# Patient Record
Sex: MALE | Race: BLACK OR AFRICAN AMERICAN | NOT HISPANIC OR LATINO | Employment: OTHER | URBAN - METROPOLITAN AREA
[De-identification: names, ages, dates, MRNs, and addresses within clinical notes are randomized per-mention and may not be internally consistent; named-entity substitution may affect disease eponyms.]

---

## 2017-02-14 ENCOUNTER — ALLSCRIPTS OFFICE VISIT (OUTPATIENT)
Dept: OTHER | Facility: OTHER | Age: 57
End: 2017-02-14

## 2017-02-14 DIAGNOSIS — N18.2 CHRONIC KIDNEY DISEASE, STAGE II (MILD): ICD-10-CM

## 2017-02-14 DIAGNOSIS — Z00.00 ENCOUNTER FOR GENERAL ADULT MEDICAL EXAMINATION WITHOUT ABNORMAL FINDINGS: ICD-10-CM

## 2017-02-14 DIAGNOSIS — G47.00 INSOMNIA: ICD-10-CM

## 2017-02-14 DIAGNOSIS — E78.49 OTHER HYPERLIPIDEMIA: ICD-10-CM

## 2017-02-14 DIAGNOSIS — Z12.5 ENCOUNTER FOR SCREENING FOR MALIGNANT NEOPLASM OF PROSTATE: ICD-10-CM

## 2017-02-14 DIAGNOSIS — I10 ESSENTIAL (PRIMARY) HYPERTENSION: ICD-10-CM

## 2017-02-14 DIAGNOSIS — G43.109 MIGRAINE WITH AURA AND WITHOUT STATUS MIGRAINOSUS, NOT INTRACTABLE: ICD-10-CM

## 2017-08-02 ENCOUNTER — GENERIC CONVERSION - ENCOUNTER (OUTPATIENT)
Dept: OTHER | Facility: OTHER | Age: 57
End: 2017-08-02

## 2017-08-15 ENCOUNTER — ALLSCRIPTS OFFICE VISIT (OUTPATIENT)
Dept: OTHER | Facility: OTHER | Age: 57
End: 2017-08-15

## 2018-01-13 VITALS
BODY MASS INDEX: 30.99 KG/M2 | OXYGEN SATURATION: 98 % | HEART RATE: 55 BPM | SYSTOLIC BLOOD PRESSURE: 136 MMHG | DIASTOLIC BLOOD PRESSURE: 86 MMHG | HEIGHT: 71 IN | TEMPERATURE: 97.2 F | WEIGHT: 221.38 LBS

## 2018-01-14 VITALS
OXYGEN SATURATION: 98 % | WEIGHT: 209.06 LBS | RESPIRATION RATE: 14 BRPM | SYSTOLIC BLOOD PRESSURE: 134 MMHG | TEMPERATURE: 96.8 F | DIASTOLIC BLOOD PRESSURE: 84 MMHG | BODY MASS INDEX: 29.27 KG/M2 | HEIGHT: 71 IN | HEART RATE: 61 BPM

## 2018-01-15 ENCOUNTER — ALLSCRIPTS OFFICE VISIT (OUTPATIENT)
Dept: OTHER | Facility: OTHER | Age: 58
End: 2018-01-15

## 2018-01-29 ENCOUNTER — TELEPHONE (OUTPATIENT)
Dept: FAMILY MEDICINE CLINIC | Facility: CLINIC | Age: 58
End: 2018-01-29

## 2018-01-29 DIAGNOSIS — G89.29 CHRONIC NONINTRACTABLE HEADACHE, UNSPECIFIED HEADACHE TYPE: Primary | ICD-10-CM

## 2018-01-29 DIAGNOSIS — G89.4 CHRONIC PAIN SYNDROME: ICD-10-CM

## 2018-01-29 DIAGNOSIS — R51.9 CHRONIC NONINTRACTABLE HEADACHE, UNSPECIFIED HEADACHE TYPE: Primary | ICD-10-CM

## 2018-01-29 RX ORDER — ZOLPIDEM TARTRATE 10 MG/1
1 TABLET ORAL
COMMUNITY
Start: 2013-06-24 | End: 2018-02-13 | Stop reason: SDUPTHER

## 2018-01-29 RX ORDER — HYDROCODONE BITARTRATE AND ACETAMINOPHEN 10; 325 MG/1; MG/1
1 TABLET ORAL 3 TIMES DAILY
COMMUNITY
Start: 2014-04-23 | End: 2018-02-12 | Stop reason: SDUPTHER

## 2018-01-29 RX ORDER — ATENOLOL 100 MG/1
1 TABLET ORAL DAILY
COMMUNITY
Start: 2016-01-12 | End: 2018-01-30 | Stop reason: SDUPTHER

## 2018-01-29 RX ORDER — OXYCODONE HCL 20 MG/1
20 TABLET, FILM COATED, EXTENDED RELEASE ORAL EVERY 12 HOURS SCHEDULED
Qty: 60 TABLET | Refills: 0 | Status: SHIPPED | OUTPATIENT
Start: 2018-01-29 | End: 2018-03-12 | Stop reason: SDUPTHER

## 2018-01-29 RX ORDER — BETAMETHASONE DIPROPIONATE 0.5 MG/G
CREAM TOPICAL 2 TIMES DAILY
COMMUNITY
Start: 2017-08-15 | End: 2018-06-21 | Stop reason: SDUPTHER

## 2018-01-29 RX ORDER — OXYCODONE HCL 20 MG/1
1 TABLET, FILM COATED, EXTENDED RELEASE ORAL 2 TIMES DAILY
COMMUNITY
Start: 2013-10-10 | End: 2018-01-29

## 2018-01-29 RX ORDER — TRIAMCINOLONE ACETONIDE 5 MG/G
CREAM TOPICAL 3 TIMES DAILY
COMMUNITY
Start: 2015-08-30 | End: 2018-08-07 | Stop reason: SDUPTHER

## 2018-01-29 RX ORDER — SILDENAFIL 100 MG/1
100 TABLET, FILM COATED ORAL AS NEEDED
COMMUNITY
Start: 2017-02-14 | End: 2022-03-02 | Stop reason: SDUPTHER

## 2018-01-29 NOTE — TELEPHONE ENCOUNTER
Patient called stating that he changed his medication from Oxycodone to [facility]  Patient states he in unhappy with the medication and would like to change is medication to OxyContin 20 MG Er  Please advise if you have any question patient would appreciate a call back from you

## 2018-01-30 DIAGNOSIS — I10 ESSENTIAL HYPERTENSION: Primary | ICD-10-CM

## 2018-01-31 RX ORDER — ATENOLOL 100 MG/1
TABLET ORAL
Qty: 30 TABLET | Refills: 5 | Status: SHIPPED | OUTPATIENT
Start: 2018-01-31 | End: 2018-02-28 | Stop reason: SDUPTHER

## 2018-02-09 ENCOUNTER — TELEPHONE (OUTPATIENT)
Dept: FAMILY MEDICINE CLINIC | Facility: CLINIC | Age: 58
End: 2018-02-09

## 2018-02-09 NOTE — TELEPHONE ENCOUNTER
I spoke with patient  He had a question regarding his current medication situation w/  Becka Jones nd oxycontin  Patient did not find Xstampza efficacious  He is trying to get OxyContin Re approved for him, which she has done well on for quite some time  Miguel Mendoza is working with him on this issue

## 2018-02-12 DIAGNOSIS — G43.119 INTRACTABLE MIGRAINE WITH AURA WITHOUT STATUS MIGRAINOSUS: Primary | ICD-10-CM

## 2018-02-12 RX ORDER — HYDROCODONE BITARTRATE AND ACETAMINOPHEN 10; 325 MG/1; MG/1
1 TABLET ORAL 3 TIMES DAILY
Qty: 90 TABLET | Refills: 0 | Status: SHIPPED | OUTPATIENT
Start: 2018-02-12 | End: 2018-02-13 | Stop reason: SDUPTHER

## 2018-02-13 ENCOUNTER — OFFICE VISIT (OUTPATIENT)
Dept: FAMILY MEDICINE CLINIC | Facility: CLINIC | Age: 58
End: 2018-02-13
Payer: COMMERCIAL

## 2018-02-13 VITALS
DIASTOLIC BLOOD PRESSURE: 84 MMHG | HEART RATE: 61 BPM | SYSTOLIC BLOOD PRESSURE: 142 MMHG | OXYGEN SATURATION: 96 % | HEIGHT: 70 IN | TEMPERATURE: 97.1 F | WEIGHT: 214.9 LBS | BODY MASS INDEX: 30.77 KG/M2

## 2018-02-13 DIAGNOSIS — M17.10 ARTHRITIS OF KNEE: ICD-10-CM

## 2018-02-13 DIAGNOSIS — G47.30 SLEEP APNEA, UNSPECIFIED TYPE: Primary | ICD-10-CM

## 2018-02-13 DIAGNOSIS — G43.119 INTRACTABLE MIGRAINE WITH AURA WITHOUT STATUS MIGRAINOSUS: ICD-10-CM

## 2018-02-13 DIAGNOSIS — N40.1 BENIGN PROSTATIC HYPERPLASIA WITH WEAK URINARY STREAM: ICD-10-CM

## 2018-02-13 DIAGNOSIS — R39.12 BENIGN PROSTATIC HYPERPLASIA WITH WEAK URINARY STREAM: ICD-10-CM

## 2018-02-13 PROCEDURE — 99214 OFFICE O/P EST MOD 30 MIN: CPT | Performed by: FAMILY MEDICINE

## 2018-02-13 RX ORDER — ZOLPIDEM TARTRATE 10 MG/1
10 TABLET ORAL
Qty: 30 TABLET | Refills: 2 | Status: SHIPPED | OUTPATIENT
Start: 2018-02-13 | End: 2018-05-23 | Stop reason: SDUPTHER

## 2018-02-13 RX ORDER — HYDROCODONE BITARTRATE AND ACETAMINOPHEN 10; 325 MG/1; MG/1
1 TABLET ORAL 3 TIMES DAILY
Qty: 90 TABLET | Refills: 0 | Status: SHIPPED | OUTPATIENT
Start: 2018-02-13 | End: 2018-03-12 | Stop reason: SDUPTHER

## 2018-02-14 NOTE — ASSESSMENT & PLAN NOTE
Patient had been stable on OxyContin 20 twice daily and hydrocodone 10/325 t i d   But his formulary no longer covers OxyContin, so he was switched to [address], but medication was not efficacious  Will attempt to get OxyContin covered or perhaps another alternative medication  Will refill hydrocodone today

## 2018-02-14 NOTE — ASSESSMENT & PLAN NOTE
Patient with history of weak urinary stream and nocturia  He is due for PSA    He will get this drawn in near future consider trial tamsulosin if symptoms persist

## 2018-02-14 NOTE — PROGRESS NOTES
Assessment/Plan:    Classic migraine with aura  Patient had been stable on OxyContin 20 twice daily and hydrocodone 10/325 t i d   But his formulary no longer covers OxyContin, so he was switched to [address], but medication was not efficacious  Will attempt to get OxyContin covered or perhaps another alternative medication  Will refill hydrocodone today  Arthritis of knee  History of chronic pain  Patient has been using "blue Emu" cream with benefit  Chronic kidney disease (CKD), stage II (mild)  Has been stable  Patient is due for blood work  He will get done in near future  Will call    Erectile dysfunction  Doing well with occasional use of Viagra without side effects    Benign prostatic hyperplasia with weak urinary stream  Patient with history of weak urinary stream and nocturia  He is due for PSA  He will get this drawn in near future consider trial tamsulosin if symptoms persist     Insomnia  Doing well on zolpidem 10 mg nightly without side effects    Benign essential hypertension  Reasonably controlled on atenolol 100 mg daily  Will continue to monitor       Diagnoses and all orders for this visit:    Sleep apnea, unspecified type  -     zolpidem (AMBIEN) 10 mg tablet; Take 1 tablet (10 mg total) by mouth daily at bedtime as needed for sleep    Intractable migraine with aura without status migrainosus  -     HYDROcodone-acetaminophen (NORCO)  mg per tablet; Take 1 tablet by mouth 3 (three) times a day Earliest Fill Date: 2/13/18 Max Daily Amount: 3 tablets    Arthritis of knee    Benign prostatic hyperplasia with weak urinary stream      patient will get fasting blood work done in near future  Will call with results  Recheck in 6 months (will probably need labs prior)     Subjective:      Patient ID: Omkar Rondon is a 65 y o  male  This is a recheck appointment for patient's chronic medical problems today  Overall he is doing well    Patient is attempting to straighten out situation with his formulary regarding his pain medications  He is requesting a cream to take for his knee  Otherwise he is doing well on all prescription medications without side effects patient has not gotten his fasting blood work done yet        The following portions of the patient's history were reviewed and updated as appropriate: allergies, current medications, past family history, past medical history, past social history, past surgical history and problem list     Review of Systems   Respiratory: Negative  Cardiovascular: Negative  Gastrointestinal: Negative  Genitourinary: Negative  Musculoskeletal: Positive for arthralgias  Objective:    Vitals:    02/13/18 1904   BP: 142/84   Pulse:    Temp:    SpO2:         Physical Exam   Cardiovascular: Normal rate, regular rhythm, normal heart sounds and intact distal pulses  Carotids: no bruits  Ext: no edema   Pulmonary/Chest: Effort normal  No respiratory distress  He has no wheezes  He has no rales  Psychiatric: He has a normal mood and affect   His behavior is normal  Thought content normal

## 2018-02-22 ENCOUNTER — TELEPHONE (OUTPATIENT)
Dept: FAMILY MEDICINE CLINIC | Facility: CLINIC | Age: 58
End: 2018-02-22

## 2018-02-27 NOTE — TELEPHONE ENCOUNTER
Results given to pt  Pt states in last visit he mentioned having urinary frequency that he has been experiencing for years, states it's getting worse to the point he's getting up constantly at night to use the bathroom  Pt would like to know if he should schedule an appointment or if he can just drop off a urine sample to dip? Pt denies irritation or burning when going

## 2018-02-28 DIAGNOSIS — I10 ESSENTIAL HYPERTENSION: ICD-10-CM

## 2018-02-28 DIAGNOSIS — R35.0 URINARY FREQUENCY: Primary | ICD-10-CM

## 2018-02-28 RX ORDER — ATENOLOL 100 MG/1
100 TABLET ORAL DAILY
Qty: 90 TABLET | Refills: 1 | Status: SHIPPED | OUTPATIENT
Start: 2018-02-28 | End: 2018-07-31 | Stop reason: SDUPTHER

## 2018-02-28 NOTE — TELEPHONE ENCOUNTER
THE [facility] with Urology details(consent ok)   Pt to contact to schedule an appt

## 2018-03-12 ENCOUNTER — OFFICE VISIT (OUTPATIENT)
Dept: FAMILY MEDICINE CLINIC | Facility: CLINIC | Age: 58
End: 2018-03-12
Payer: COMMERCIAL

## 2018-03-12 VITALS
TEMPERATURE: 96.8 F | RESPIRATION RATE: 17 BRPM | DIASTOLIC BLOOD PRESSURE: 84 MMHG | WEIGHT: 212.8 LBS | HEIGHT: 70 IN | HEART RATE: 66 BPM | OXYGEN SATURATION: 98 % | BODY MASS INDEX: 30.46 KG/M2 | SYSTOLIC BLOOD PRESSURE: 136 MMHG

## 2018-03-12 DIAGNOSIS — I10 BENIGN ESSENTIAL HYPERTENSION: ICD-10-CM

## 2018-03-12 DIAGNOSIS — G43.119 INTRACTABLE MIGRAINE WITH AURA WITHOUT STATUS MIGRAINOSUS: ICD-10-CM

## 2018-03-12 DIAGNOSIS — Z12.11 SCREENING FOR COLON CANCER: Primary | ICD-10-CM

## 2018-03-12 DIAGNOSIS — G89.29 CHRONIC NONINTRACTABLE HEADACHE, UNSPECIFIED HEADACHE TYPE: ICD-10-CM

## 2018-03-12 DIAGNOSIS — M79.641 HAND PAIN, RIGHT: ICD-10-CM

## 2018-03-12 DIAGNOSIS — G89.4 CHRONIC PAIN SYNDROME: ICD-10-CM

## 2018-03-12 DIAGNOSIS — R51.9 CHRONIC NONINTRACTABLE HEADACHE, UNSPECIFIED HEADACHE TYPE: ICD-10-CM

## 2018-03-12 PROCEDURE — 99213 OFFICE O/P EST LOW 20 MIN: CPT | Performed by: FAMILY MEDICINE

## 2018-03-12 PROCEDURE — 3075F SYST BP GE 130 - 139MM HG: CPT | Performed by: FAMILY MEDICINE

## 2018-03-12 PROCEDURE — 3079F DIAST BP 80-89 MM HG: CPT | Performed by: FAMILY MEDICINE

## 2018-03-12 RX ORDER — OXYCODONE HCL 20 MG/1
20 TABLET, FILM COATED, EXTENDED RELEASE ORAL EVERY 12 HOURS SCHEDULED
Qty: 60 TABLET | Refills: 0 | Status: SHIPPED | OUTPATIENT
Start: 2018-03-12 | End: 2018-04-05 | Stop reason: ALTCHOICE

## 2018-03-12 RX ORDER — HYDROCODONE BITARTRATE AND ACETAMINOPHEN 10; 325 MG/1; MG/1
1 TABLET ORAL 3 TIMES DAILY
Qty: 90 TABLET | Refills: 0 | Status: SHIPPED | OUTPATIENT
Start: 2018-03-12 | End: 2018-04-05 | Stop reason: ALTCHOICE

## 2018-03-12 NOTE — ASSESSMENT & PLAN NOTE
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

## 2018-03-12 NOTE — PROGRESS NOTES
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  Patient presents to have this checked out today  Patient has no history of prior problems  Maintains an active lifestyle  Denies any chest pain, shortness of breath, palpitations  Patient's examination today was negative for any clinically significant murmur (patient tested sitting, with Valsalva, standing, and crouching)  I completed patient's employee health form  (will fax for him)  Continue regular 6 month appointments    Hand pain, right  Patient was swelling on his 3rd MCP knuckle  Will sent for x-rays of right hand       Diagnoses and all orders for this visit:    Screening for colon cancer    Chronic nonintractable headache, unspecified headache type  -     oxyCODONE (OxyCONTIN) 20 mg 12 hr tablet; Take 1 tablet (20 mg total) by mouth every 12 (twelve) hours Earliest Fill Date: 3/12/18 Max Daily Amount: 40 mg    Chronic pain syndrome  -     oxyCODONE (OxyCONTIN) 20 mg 12 hr tablet; Take 1 tablet (20 mg total) by mouth every 12 (twelve) hours Earliest Fill Date: 3/12/18 Max Daily Amount: 40 mg    Intractable migraine with aura without status migrainosus  -     HYDROcodone-acetaminophen (NORCO)  mg per tablet; Take 1 tablet by mouth 3 (three) times a day Earliest Fill Date: 3/12/18 Max Daily Amount: 3 tablets    Hand pain, right  -     XR hand 3+ vw right; Future    Benign essential hypertension    Other orders  -     Cancel: Ambulatory referral to Gastroenterology; Future      will call with x-ray results  Keep next regularly scheduled 6 month appointment    Subjective:      Patient ID: Catarina Crockett is a 63 y o  male  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  Patient presents to have this checked out today  Patient has no history of prior problems  Maintains an active lifestyle    Denies any chest pain, shortness of breath, palpitations        The following portions of the patient's history were reviewed and updated as appropriate: allergies, current medications, past family history, past medical history, past social history, past surgical history and problem list     Review of Systems   Respiratory: Negative  Cardiovascular: Negative  Gastrointestinal: Negative  Genitourinary: Negative  Objective:      /84   Pulse 66   Temp (!) 96 8 °F (36 °C) (Tympanic)   Resp 17   Ht 5' 10" (1 778 m)   Wt 96 5 kg (212 lb 12 8 oz)   SpO2 98%   BMI 30 53 kg/m²          Physical Exam   Cardiovascular: Normal rate, regular rhythm, normal heart sounds and intact distal pulses  Carotids: no bruits  Ext: no edema   Pulmonary/Chest: Effort normal  No respiratory distress  He has no wheezes  He has no rales  Psychiatric: He has a normal mood and affect   His behavior is normal  Thought content normal

## 2018-03-27 ENCOUNTER — TELEPHONE (OUTPATIENT)
Dept: FAMILY MEDICINE CLINIC | Facility: CLINIC | Age: 58
End: 2018-03-27

## 2018-03-27 NOTE — TELEPHONE ENCOUNTER
Patient called stating that he would like to dicuss with you having to switch a medication because of his employer  Patient would like for you to give him a call back

## 2018-03-28 NOTE — TELEPHONE ENCOUNTER
I spoke with patient  He has applied for a job at [employer]  He received a call that the require him to be off his narcotic medications  He states they sent a form today for me to complete  He currently is taking OxyContin 20 mg Q 12 along with hydrocodone 10/325, t i d  p r n     I suggested that he start by decreasing dosage of OxyContin to 10 mg twice daily and stop hydrocodone (will wait for paperwork from [facility] to start wean off meds)

## 2018-04-02 ENCOUNTER — TELEPHONE (OUTPATIENT)
Dept: FAMILY MEDICINE CLINIC | Facility: CLINIC | Age: 58
End: 2018-04-02

## 2018-04-02 NOTE — TELEPHONE ENCOUNTER
Call patient  I have not yet received any correspondence from his employer yet regarding his medications  Does he still want to stop his pain meds? If so, I would recommend reducing OxyContin to 10 mg twice daily for 2 weeks, then once daily for 2 weeks     Then discontinuing

## 2018-04-03 NOTE — TELEPHONE ENCOUNTER
Pt called back, and stated that he has been off the rx for about a week now  He also stated that he will find out what the hold up is with the paper work from the employer

## 2018-04-03 NOTE — TELEPHONE ENCOUNTER
THE [facility] with details(consent ok)   Pt to call office with any questions

## 2018-04-04 NOTE — TELEPHONE ENCOUNTER
I spoke with patient  He has stopped his Oxy Contin and hydrocodone x 1 week  And is doing well off meds  He would like to continue  ( His new job requirements as a  for [employer] highly recommend/requires him being off of opioids to drive)  Call further problems    Form was completed and faxed back to [facility]

## 2018-05-23 DIAGNOSIS — G47.30 SLEEP APNEA, UNSPECIFIED TYPE: ICD-10-CM

## 2018-05-23 RX ORDER — ZOLPIDEM TARTRATE 10 MG/1
10 TABLET ORAL
Qty: 30 TABLET | Refills: 0 | Status: SHIPPED | OUTPATIENT
Start: 2018-05-23 | End: 2018-06-21 | Stop reason: SDUPTHER

## 2018-06-05 ENCOUNTER — OFFICE VISIT (OUTPATIENT)
Dept: FAMILY MEDICINE CLINIC | Facility: CLINIC | Age: 58
End: 2018-06-05
Payer: COMMERCIAL

## 2018-06-05 VITALS
TEMPERATURE: 97.2 F | HEIGHT: 70 IN | OXYGEN SATURATION: 98 % | DIASTOLIC BLOOD PRESSURE: 82 MMHG | RESPIRATION RATE: 18 BRPM | SYSTOLIC BLOOD PRESSURE: 136 MMHG | HEART RATE: 75 BPM | BODY MASS INDEX: 30.26 KG/M2 | WEIGHT: 211.4 LBS

## 2018-06-05 DIAGNOSIS — H66.90 ACUTE OTITIS MEDIA, UNSPECIFIED OTITIS MEDIA TYPE: Primary | ICD-10-CM

## 2018-06-05 PROCEDURE — 99213 OFFICE O/P EST LOW 20 MIN: CPT | Performed by: FAMILY MEDICINE

## 2018-06-05 RX ORDER — METHYLPREDNISOLONE 4 MG/1
TABLET ORAL
Qty: 21 TABLET | Refills: 0 | Status: SHIPPED | OUTPATIENT
Start: 2018-06-05 | End: 2019-09-18

## 2018-06-05 RX ORDER — AMOXICILLIN 500 MG/1
500 CAPSULE ORAL EVERY 8 HOURS SCHEDULED
Qty: 30 CAPSULE | Refills: 0 | Status: SHIPPED | OUTPATIENT
Start: 2018-06-05 | End: 2018-06-15

## 2018-06-05 NOTE — PROGRESS NOTES
Assessment/Plan:    Acute otitis media   Rx given for amoxicillin and Medrol Dosepak  Call if further problems       Diagnoses and all orders for this visit:    Acute otitis media, unspecified otitis media type  -     amoxicillin (AMOXIL) 500 mg capsule; Take 1 capsule (500 mg total) by mouth every 8 (eight) hours for 10 days  -     Methylprednisolone 4 MG TBPK; Use as directed on package          Subjective:      Patient ID: Aryan Du is a 63 y o  male  b/l ear pain for the past few days  Sore throat, congestion, pnd        Earache    Associated symptoms include coughing  The following portions of the patient's history were reviewed and updated as appropriate: allergies, current medications, past family history, past medical history, past social history, past surgical history and problem list     Review of Systems   Constitutional: Negative for chills and fever  HENT: Positive for congestion, ear pain and postnasal drip  Respiratory: Positive for cough  Negative for shortness of breath  Cardiovascular: Negative  Objective:      /82   Pulse 75   Temp (!) 97 2 °F (36 2 °C) (Tympanic)   Resp 18   Ht 5' 10 2" (1 783 m)   Wt 95 9 kg (211 lb 6 4 oz)   SpO2 98%   BMI 30 16 kg/m²          Physical Exam   HENT:   TMS  Bulging bilaterally   Neck: Normal range of motion  Neck supple     Pulmonary/Chest: Effort normal and breath sounds normal

## 2018-06-21 DIAGNOSIS — L30.9 DERMATITIS: Primary | ICD-10-CM

## 2018-06-21 DIAGNOSIS — G47.30 SLEEP APNEA, UNSPECIFIED TYPE: ICD-10-CM

## 2018-06-21 RX ORDER — BETAMETHASONE DIPROPIONATE 0.5 MG/G
CREAM TOPICAL
Qty: 50 G | Refills: 3 | Status: SHIPPED | OUTPATIENT
Start: 2018-06-21 | End: 2019-01-12 | Stop reason: SDUPTHER

## 2018-06-22 RX ORDER — ZOLPIDEM TARTRATE 10 MG/1
TABLET ORAL
Qty: 30 TABLET | Refills: 0 | Status: SHIPPED | OUTPATIENT
Start: 2018-06-22 | End: 2018-07-21 | Stop reason: SDUPTHER

## 2018-07-21 DIAGNOSIS — G47.30 SLEEP APNEA, UNSPECIFIED TYPE: ICD-10-CM

## 2018-07-21 RX ORDER — ZOLPIDEM TARTRATE 10 MG/1
TABLET ORAL
Qty: 30 TABLET | Refills: 0 | Status: SHIPPED | OUTPATIENT
Start: 2018-07-21 | End: 2019-09-18 | Stop reason: ALTCHOICE

## 2018-07-31 DIAGNOSIS — I10 ESSENTIAL HYPERTENSION: ICD-10-CM

## 2018-07-31 RX ORDER — ATENOLOL 100 MG/1
100 TABLET ORAL DAILY
Qty: 90 TABLET | Refills: 0 | Status: SHIPPED | OUTPATIENT
Start: 2018-07-31 | End: 2018-10-13 | Stop reason: SDUPTHER

## 2018-08-07 DIAGNOSIS — L30.9 DERMATITIS: Primary | ICD-10-CM

## 2018-08-07 RX ORDER — TRIAMCINOLONE ACETONIDE 5 MG/G
CREAM TOPICAL
Qty: 30 G | Refills: 2 | Status: SHIPPED | OUTPATIENT
Start: 2018-08-07 | End: 2019-09-18 | Stop reason: ALTCHOICE

## 2018-08-12 DIAGNOSIS — G47.30 SLEEP APNEA, UNSPECIFIED TYPE: ICD-10-CM

## 2018-08-13 RX ORDER — ZOLPIDEM TARTRATE 10 MG/1
TABLET ORAL
Qty: 30 TABLET | Refills: 0 | Status: SHIPPED | OUTPATIENT
Start: 2018-08-13 | End: 2018-08-16 | Stop reason: SDUPTHER

## 2018-08-16 DIAGNOSIS — G47.30 SLEEP APNEA, UNSPECIFIED TYPE: ICD-10-CM

## 2018-08-16 RX ORDER — ZOLPIDEM TARTRATE 10 MG/1
10 TABLET ORAL
Qty: 30 TABLET | Refills: 0 | Status: SHIPPED | OUTPATIENT
Start: 2018-08-16 | End: 2018-08-21 | Stop reason: SDUPTHER

## 2018-08-21 ENCOUNTER — OFFICE VISIT (OUTPATIENT)
Dept: FAMILY MEDICINE CLINIC | Facility: CLINIC | Age: 58
End: 2018-08-21
Payer: COMMERCIAL

## 2018-08-21 VITALS
HEART RATE: 55 BPM | RESPIRATION RATE: 16 BRPM | DIASTOLIC BLOOD PRESSURE: 90 MMHG | SYSTOLIC BLOOD PRESSURE: 136 MMHG | BODY MASS INDEX: 30.96 KG/M2 | HEIGHT: 69 IN | WEIGHT: 209 LBS | TEMPERATURE: 95.9 F | OXYGEN SATURATION: 98 %

## 2018-08-21 DIAGNOSIS — G47.30 SLEEP APNEA, UNSPECIFIED TYPE: ICD-10-CM

## 2018-08-21 DIAGNOSIS — G43.109 MIGRAINE WITH AURA AND WITHOUT STATUS MIGRAINOSUS, NOT INTRACTABLE: ICD-10-CM

## 2018-08-21 DIAGNOSIS — G47.00 INSOMNIA, UNSPECIFIED TYPE: ICD-10-CM

## 2018-08-21 DIAGNOSIS — I10 BENIGN ESSENTIAL HYPERTENSION: Primary | ICD-10-CM

## 2018-08-21 DIAGNOSIS — M79.641 HAND PAIN, RIGHT: ICD-10-CM

## 2018-08-21 DIAGNOSIS — H69.83 DYSFUNCTION OF BOTH EUSTACHIAN TUBES: ICD-10-CM

## 2018-08-21 PROBLEM — H69.93 DYSFUNCTION OF BOTH EUSTACHIAN TUBES: Status: ACTIVE | Noted: 2018-08-21

## 2018-08-21 PROCEDURE — 1036F TOBACCO NON-USER: CPT | Performed by: FAMILY MEDICINE

## 2018-08-21 PROCEDURE — 99214 OFFICE O/P EST MOD 30 MIN: CPT | Performed by: FAMILY MEDICINE

## 2018-08-21 RX ORDER — ZOLPIDEM TARTRATE 10 MG/1
10 TABLET ORAL
Qty: 30 TABLET | Refills: 2 | Status: SHIPPED | OUTPATIENT
Start: 2018-08-21 | End: 2018-09-17 | Stop reason: SDUPTHER

## 2018-08-21 NOTE — ASSESSMENT & PLAN NOTE
Ongoing bilateral hand pain and stiffness  Arthritis panel showed elevated C reactive protein, otherwise negative  I again advised him to get his x-rays done    Will also refer to Rheumatology for evaluation

## 2018-08-21 NOTE — ASSESSMENT & PLAN NOTE
Eustachian tube dysfunction  Recommend start Flonase    Recommend schedule follow up with his ENT if symptoms persist ( patient sees [facility])

## 2018-08-21 NOTE — PROGRESS NOTES
Assessment/Plan:    Benign essential hypertension   Reasonably controlled on atenolol 100 milligrams daily    Dysfunction of both eustachian tubes   Eustachian tube dysfunction  Recommend start Flonase  Recommend schedule follow up with his ENT if symptoms persist ( patient sees [facility])    Hand pain, right   Ongoing bilateral hand pain and stiffness  Arthritis panel showed elevated C reactive protein, otherwise negative  I again advised him to get his x-rays done  Will also refer to Rheumatology for evaluation    Insomnia   Doing well on zolpidem 10 milligrams at HS without side effects    Classic migraine with aura   Doing   Much better since discontinuing OxyContin and hydrocodone  Diagnoses and all orders for this visit:    Benign essential hypertension    Insomnia, unspecified type    Hand pain, right  -     Ambulatory referral to Rheumatology; Future    Dysfunction of both eustachian tubes    Sleep apnea, unspecified type  -     zolpidem (AMBIEN) 10 mg tablet; Take 1 tablet (10 mg total) by mouth daily at bedtime as needed for sleep    Migraine with aura and without status migrainosus, not intractable      LABS FROM February 2018 WERE REVIEWED  WILL CHECK YEARLY     6 MONTHS     Subjective:      Patient ID: Anahy Cerda is a 63 y o  male  Recheck chronic medical probs  Pt still having issues/pain in ears, otherwise doing well  No probs w/ current meds  Patient also having continued pain in his hands        The following portions of the patient's history were reviewed and updated as appropriate: allergies, current medications, past family history, past medical history, past social history, past surgical history and problem list     Review of Systems   Respiratory: Negative  Cardiovascular: Negative  Gastrointestinal: Negative  Genitourinary: Negative  Musculoskeletal: Positive for arthralgias           Objective:      /90 (BP Location: Left arm, Patient Position: Sitting, Cuff Size: Adult)   Pulse 55   Temp (!) 95 9 °F (35 5 °C) (Tympanic)   Resp 16   Ht 5' 9 29" (1 76 m)   Wt 94 8 kg (209 lb)   SpO2 98%   BMI 30 61 kg/m²          Physical Exam   Cardiovascular: Normal rate, regular rhythm, normal heart sounds and intact distal pulses  Carotids: no bruits  Ext: no edema   Pulmonary/Chest: Effort normal  No respiratory distress  He has no wheezes  He has no rales  Psychiatric: He has a normal mood and affect   His behavior is normal  Thought content normal

## 2018-09-17 DIAGNOSIS — G47.30 SLEEP APNEA, UNSPECIFIED TYPE: ICD-10-CM

## 2018-09-17 RX ORDER — ZOLPIDEM TARTRATE 10 MG/1
10 TABLET ORAL
Qty: 30 TABLET | Refills: 2 | Status: SHIPPED | OUTPATIENT
Start: 2018-09-17 | End: 2019-01-14 | Stop reason: SDUPTHER

## 2018-10-13 DIAGNOSIS — I10 ESSENTIAL HYPERTENSION: ICD-10-CM

## 2018-10-14 RX ORDER — ATENOLOL 100 MG/1
TABLET ORAL
Qty: 90 TABLET | Refills: 0 | Status: SHIPPED | OUTPATIENT
Start: 2018-10-14 | End: 2019-01-07 | Stop reason: SDUPTHER

## 2019-01-07 DIAGNOSIS — I10 ESSENTIAL HYPERTENSION: ICD-10-CM

## 2019-01-07 RX ORDER — ATENOLOL 100 MG/1
100 TABLET ORAL DAILY
Qty: 90 TABLET | Refills: 1 | Status: SHIPPED | OUTPATIENT
Start: 2019-01-07 | End: 2019-07-15 | Stop reason: SDUPTHER

## 2019-01-12 DIAGNOSIS — L30.9 DERMATITIS: ICD-10-CM

## 2019-01-14 DIAGNOSIS — G47.30 SLEEP APNEA, UNSPECIFIED TYPE: ICD-10-CM

## 2019-01-14 RX ORDER — BETAMETHASONE DIPROPIONATE 0.5 MG/G
CREAM TOPICAL
Qty: 50 G | Refills: 3 | Status: SHIPPED | OUTPATIENT
Start: 2019-01-14 | End: 2019-02-14 | Stop reason: SDUPTHER

## 2019-01-14 RX ORDER — ZOLPIDEM TARTRATE 10 MG/1
10 TABLET ORAL
Qty: 30 TABLET | Refills: 0 | Status: SHIPPED | OUTPATIENT
Start: 2019-01-14 | End: 2019-02-14 | Stop reason: SDUPTHER

## 2019-02-14 DIAGNOSIS — L30.9 DERMATITIS: ICD-10-CM

## 2019-02-14 DIAGNOSIS — G47.30 SLEEP APNEA, UNSPECIFIED TYPE: ICD-10-CM

## 2019-02-15 RX ORDER — ZOLPIDEM TARTRATE 10 MG/1
10 TABLET ORAL
Qty: 30 TABLET | Refills: 0 | Status: SHIPPED | OUTPATIENT
Start: 2019-02-15 | End: 2019-03-15 | Stop reason: SDUPTHER

## 2019-02-15 RX ORDER — BETAMETHASONE DIPROPIONATE 0.5 MG/G
CREAM TOPICAL
Qty: 50 G | Refills: 0 | Status: SHIPPED | OUTPATIENT
Start: 2019-02-15 | End: 2019-09-18 | Stop reason: ALTCHOICE

## 2019-03-15 DIAGNOSIS — G47.30 SLEEP APNEA, UNSPECIFIED TYPE: ICD-10-CM

## 2019-03-16 RX ORDER — ZOLPIDEM TARTRATE 10 MG/1
10 TABLET ORAL
Qty: 30 TABLET | Refills: 0 | Status: SHIPPED | OUTPATIENT
Start: 2019-03-16 | End: 2019-04-19 | Stop reason: SDUPTHER

## 2019-04-19 DIAGNOSIS — L30.9 DERMATITIS: ICD-10-CM

## 2019-04-19 DIAGNOSIS — G47.30 SLEEP APNEA, UNSPECIFIED TYPE: ICD-10-CM

## 2019-04-19 RX ORDER — ZOLPIDEM TARTRATE 10 MG/1
10 TABLET ORAL
Qty: 30 TABLET | Refills: 0 | Status: SHIPPED | OUTPATIENT
Start: 2019-04-19 | End: 2019-05-16 | Stop reason: SDUPTHER

## 2019-04-19 RX ORDER — BETAMETHASONE DIPROPIONATE 0.5 MG/G
CREAM TOPICAL
Qty: 50 G | Refills: 2 | Status: SHIPPED | OUTPATIENT
Start: 2019-04-19 | End: 2019-11-14 | Stop reason: SDUPTHER

## 2019-05-16 DIAGNOSIS — G47.30 SLEEP APNEA, UNSPECIFIED TYPE: ICD-10-CM

## 2019-05-17 RX ORDER — ZOLPIDEM TARTRATE 10 MG/1
10 TABLET ORAL
Qty: 30 TABLET | Refills: 0 | Status: SHIPPED | OUTPATIENT
Start: 2019-05-17 | End: 2019-06-17 | Stop reason: SDUPTHER

## 2019-06-17 DIAGNOSIS — G47.30 SLEEP APNEA, UNSPECIFIED TYPE: ICD-10-CM

## 2019-06-17 RX ORDER — ZOLPIDEM TARTRATE 10 MG/1
10 TABLET ORAL
Qty: 30 TABLET | Refills: 0 | Status: SHIPPED | OUTPATIENT
Start: 2019-06-17 | End: 2019-07-15 | Stop reason: SDUPTHER

## 2019-07-15 DIAGNOSIS — G47.30 SLEEP APNEA, UNSPECIFIED TYPE: ICD-10-CM

## 2019-07-15 DIAGNOSIS — I10 ESSENTIAL HYPERTENSION: ICD-10-CM

## 2019-07-15 RX ORDER — ZOLPIDEM TARTRATE 10 MG/1
10 TABLET ORAL
Qty: 30 TABLET | Refills: 0 | Status: SHIPPED | OUTPATIENT
Start: 2019-07-15 | End: 2019-08-15 | Stop reason: SDUPTHER

## 2019-07-15 RX ORDER — ATENOLOL 100 MG/1
TABLET ORAL
Qty: 90 TABLET | Refills: 1 | Status: SHIPPED | OUTPATIENT
Start: 2019-07-15 | End: 2020-01-09

## 2019-08-15 DIAGNOSIS — G47.30 SLEEP APNEA, UNSPECIFIED TYPE: ICD-10-CM

## 2019-08-15 RX ORDER — ZOLPIDEM TARTRATE 10 MG/1
10 TABLET ORAL
Qty: 30 TABLET | Refills: 0 | Status: SHIPPED | OUTPATIENT
Start: 2019-08-15 | End: 2019-09-14 | Stop reason: SDUPTHER

## 2019-08-15 NOTE — TELEPHONE ENCOUNTER
Last follow up 8/21/18    PDMP checked: Last fill 7/15/19    Pt is scheduled for med check up on  8/30/19, can Jose Manuel Zuleta orders be placed    Thank You

## 2019-08-28 ENCOUNTER — TELEPHONE (OUTPATIENT)
Dept: FAMILY MEDICINE CLINIC | Facility: CLINIC | Age: 59
End: 2019-08-28

## 2019-08-28 DIAGNOSIS — I10 HYPERTENSION, UNSPECIFIED TYPE: ICD-10-CM

## 2019-08-28 DIAGNOSIS — E03.9 HYPOTHYROIDISM, UNSPECIFIED TYPE: ICD-10-CM

## 2019-08-28 NOTE — TELEPHONE ENCOUNTER
Pt called in to the office stating [facility]  Thank you!

## 2019-08-29 DIAGNOSIS — E78.49 FAMILIAL COMBINED HYPERLIPIDEMIA: ICD-10-CM

## 2019-08-29 DIAGNOSIS — Z12.5 SCREENING FOR PROSTATE CANCER: ICD-10-CM

## 2019-08-29 DIAGNOSIS — I10 BENIGN ESSENTIAL HYPERTENSION: Primary | ICD-10-CM

## 2019-08-29 DIAGNOSIS — N18.2 CHRONIC KIDNEY DISEASE (CKD), STAGE II (MILD): ICD-10-CM

## 2019-08-29 NOTE — TELEPHONE ENCOUNTER
Called pt and informed pt states he will get labs done on Monday will take the scripts with him when he comes to his [facility] tomorrow

## 2019-08-29 NOTE — TELEPHONE ENCOUNTER
Lab orders placed    These either need to be picked up, or we can fax them to [facility] labs

## 2019-09-14 DIAGNOSIS — G47.30 SLEEP APNEA, UNSPECIFIED TYPE: ICD-10-CM

## 2019-09-14 RX ORDER — ZOLPIDEM TARTRATE 10 MG/1
10 TABLET ORAL
Qty: 30 TABLET | Refills: 0 | Status: SHIPPED | OUTPATIENT
Start: 2019-09-14 | End: 2019-09-18 | Stop reason: SDUPTHER

## 2019-09-18 ENCOUNTER — OFFICE VISIT (OUTPATIENT)
Dept: FAMILY MEDICINE CLINIC | Facility: CLINIC | Age: 59
End: 2019-09-18
Payer: COMMERCIAL

## 2019-09-18 VITALS
SYSTOLIC BLOOD PRESSURE: 158 MMHG | OXYGEN SATURATION: 98 % | BODY MASS INDEX: 30.31 KG/M2 | HEIGHT: 71 IN | RESPIRATION RATE: 18 BRPM | WEIGHT: 216.5 LBS | HEART RATE: 62 BPM | TEMPERATURE: 97.6 F | DIASTOLIC BLOOD PRESSURE: 108 MMHG

## 2019-09-18 DIAGNOSIS — G47.30 SLEEP APNEA, UNSPECIFIED TYPE: ICD-10-CM

## 2019-09-18 DIAGNOSIS — R06.83 SNORING: ICD-10-CM

## 2019-09-18 DIAGNOSIS — G43.109 MIGRAINE WITH AURA AND WITHOUT STATUS MIGRAINOSUS, NOT INTRACTABLE: ICD-10-CM

## 2019-09-18 DIAGNOSIS — G47.00 INSOMNIA, UNSPECIFIED TYPE: ICD-10-CM

## 2019-09-18 DIAGNOSIS — I10 BENIGN ESSENTIAL HYPERTENSION: Primary | ICD-10-CM

## 2019-09-18 LAB — HBA1C MFR BLD HPLC: 6.1 %

## 2019-09-18 PROCEDURE — 3008F BODY MASS INDEX DOCD: CPT | Performed by: FAMILY MEDICINE

## 2019-09-18 PROCEDURE — 99214 OFFICE O/P EST MOD 30 MIN: CPT | Performed by: FAMILY MEDICINE

## 2019-09-18 RX ORDER — ZOLPIDEM TARTRATE 10 MG/1
10 TABLET ORAL
Qty: 90 TABLET | Refills: 0 | Status: SHIPPED | OUTPATIENT
Start: 2019-09-18 | End: 2020-01-11

## 2019-09-18 RX ORDER — AMLODIPINE BESYLATE 5 MG/1
5 TABLET ORAL DAILY
Qty: 90 TABLET | Refills: 1 | Status: SHIPPED | OUTPATIENT
Start: 2019-09-18 | End: 2020-03-05

## 2019-09-18 NOTE — ASSESSMENT & PLAN NOTE
Patient states that his wife complains that he snores loudly with occasional gasping  He also admits to some daytime fatigue  Will sent for home sleep study

## 2019-09-18 NOTE — PROGRESS NOTES
[facility]      NAME: Nilton Carrera  AGE: 63 y o  SEX: male  : 1960   MRN: [id number]    DATE: 2019  TIME: 9:44 AM    Assessment and Plan     Problem List Items Addressed This Visit     Benign essential hypertension - Primary      Suboptimal  Patient states that that he is mostly compliant with atenolol 100  Will add amlodipine 5  Recommend checking home blood pressure readings  Call if still elevated         Relevant Medications    amLODIPine (NORVASC) 5 mg tablet    Classic migraine with aura      Headaches much improved since discontinuing OxyContin and hydrocodone  Relevant Medications    amLODIPine (NORVASC) 5 mg tablet    Insomnia       Doing well on zolpidem 10 mg nightly without side effects or falls  Will refill today         Snoring      Patient states that his wife complains that he snores loudly with occasional gasping  He also admits to some daytime fatigue  Will sent for home sleep study  Relevant Orders    Home Study      Other Visit Diagnoses     Sleep apnea, unspecified type        Relevant Medications    zolpidem (AMBIEN) 10 mg tablet              Return to office in:  Patient will get fasting blood work today  Will call with results     6 months (? Labs yearly)    Chief Complaint     Chief Complaint   Patient presents with    Follow-up     6m check up to chronic conditions with no recent labs  Pt will be going this morning to get blood work done       History of Present Illness      Patient presents for 6 month recheck appointment today  Overall is feeling well  He has noticed that his blood pressure has been a little bit elevated lately  He is mostly compliant with atenolol 100  Sleeping well on zolpidem without side effects  Headaches are much improved since discontinuing OxyContin and hydrocodone    Patient has not gotten his labs done yet      The following portions of the patient's history were reviewed and updated as appropriate: allergies, current medications, past family history, past medical history, past social history, past surgical history and problem list     Review of Systems   Review of Systems   Respiratory: Negative  Cardiovascular: Negative  Gastrointestinal: Negative  Genitourinary: Negative  Psychiatric/Behavioral: Positive for sleep disturbance  Active Problem List     Patient Active Problem List   Diagnosis    Other specified abnormal findings of blood chemistry    Arthralgia of multiple joints    Benign essential hypertension    Chronic kidney disease (CKD), stage II (mild)    Classic migraine with aura    Erectile dysfunction    Familial combined hyperlipidemia    Insomnia    Vitamin D deficiency    Arthritis of knee    Benign prostatic hyperplasia with weak urinary stream    Hand pain, right    Acute otitis media    Dysfunction of both eustachian tubes    Snoring       Objective   BP (!) 158/108 (BP Location: Left arm, Patient Position: Sitting, Cuff Size: Adult) Comment: Pt did not take BP meds  Pulse 62   Temp 97 6 °F (36 4 °C) (Tympanic)   Resp 18   Ht 5' 11" (1 803 m)   Wt 98 2 kg (216 lb 8 oz)   SpO2 98%   BMI 30 20 kg/m²     Physical Exam   Cardiovascular: Normal rate, regular rhythm, normal heart sounds and intact distal pulses  Carotids: no bruits  Ext: no edema   Pulmonary/Chest: Effort normal  No respiratory distress  He has no wheezes  He has no rales  Psychiatric: He has a normal mood and affect  His behavior is normal  Thought content normal        Pertinent Laboratory/Diagnostic Studies:   none    Current Medications     Current Outpatient Medications:     atenolol (TENORMIN) 100 mg tablet, TAKE 1 TABLET BY MOUTH EVERY DAY, Disp: 90 tablet, Rfl: 1    betamethasone, augmented, (DIPROLENE-AF) 0 05 % cream, APPLY AND RUB IN A THIN FILM TO AFFECTED AREAS TWICE DAILY  (AM AND PM)  , Disp: 50 g, Rfl: 2    sildenafil (VIAGRA) 100 mg tablet, Take 100 mg by mouth as needed , Disp: , Rfl:     zolpidem (AMBIEN) 10 mg tablet, Take 1 tablet (10 mg total) by mouth daily at bedtime as needed for sleep, Disp: 90 tablet, Rfl: 0    amLODIPine (NORVASC) 5 mg tablet, Take 1 tablet (5 mg total) by mouth daily, Disp: 90 tablet, Rfl: 1    Health Maintenance     Health Maintenance   Topic Date Due    Hepatitis C Screening  1960    Pneumococcal Vaccine: Pediatrics (0 to 5 Years) and At-Risk Patients (6 to 59 Years) (1 of 3 - PCV13) 03/02/1967    BMI: Followup Plan  03/02/1979    HEPATITIS B VACCINES (1 of 3 - Risk 3-dose series) 10/18/2019 (Originally 3/2/1980)    INFLUENZA VACCINE  10/28/2019 (Originally 7/1/2019)    Depression Screening PHQ  09/18/2020    BMI: Adult  09/18/2020    CRC Screening: Colonoscopy  01/01/2026    Pneumococcal Vaccine: 65+ Years (1 of 2 - PCV13) 03/02/2026    DTaP,Tdap,and Td Vaccines (2 - Td) 01/15/2028     Immunization History   Administered Date(s) Administered    Influenza, injectable, quadrivalent, preservative free 0 5 mL 11/17/2018    Tdap 01/15/2018       DO Grayson Bailey  [address]

## 2019-09-18 NOTE — ASSESSMENT & PLAN NOTE
Suboptimal  Patient states that that he is mostly compliant with atenolol 100  Will add amlodipine 5  Recommend checking home blood pressure readings    Call if still elevated

## 2019-09-21 DIAGNOSIS — R73.9 ELEVATED BLOOD SUGAR: Primary | ICD-10-CM

## 2019-09-24 ENCOUNTER — TELEPHONE (OUTPATIENT)
Dept: FAMILY MEDICINE CLINIC | Facility: CLINIC | Age: 59
End: 2019-09-24

## 2019-09-24 NOTE — TELEPHONE ENCOUNTER
Voicemail left yesterday at [phone] from patient asking for lab results  LMOM with detailed message regarding lab results, told him to follow up in 6 months, repeat labs 1 year  Call if any other questions

## 2019-09-25 ENCOUNTER — TELEPHONE (OUTPATIENT)
Dept: FAMILY MEDICINE CLINIC | Facility: CLINIC | Age: 59
End: 2019-09-25

## 2019-10-08 ENCOUNTER — TELEPHONE (OUTPATIENT)
Dept: SLEEP CENTER | Facility: CLINIC | Age: 59
End: 2019-10-08

## 2019-10-08 NOTE — TELEPHONE ENCOUNTER
----- Message from Delfino Lovett DO sent at 10/8/2019  3:28 PM EDT -----  Chart reviewed  Study approved   Schedule HST   ----- Message -----  From: Jonathan Tee  Sent: 9/27/2019  11:18 AM EDT  To: Sleep Medicine [facility] Provider    Study for approval

## 2019-10-30 ENCOUNTER — TELEPHONE (OUTPATIENT)
Dept: FAMILY MEDICINE CLINIC | Facility: CLINIC | Age: 59
End: 2019-10-30

## 2019-10-30 NOTE — TELEPHONE ENCOUNTER
Pt scheduled appt for Sleep Study, received approval through insurance, pt had to reschedule the appt and that prior authorization has   Needs to authorization reschedule appt for sleep study

## 2019-11-05 NOTE — TELEPHONE ENCOUNTER
HOME STUDY REAPPROVED THROUGH [address]  AUTH# [id number] STARTES 11/6/19 TO 12/20/19  LM FOR PT TO SCHEDULE APPT

## 2019-11-06 ENCOUNTER — TRANSCRIBE ORDERS (OUTPATIENT)
Dept: ADMINISTRATIVE | Facility: HOSPITAL | Age: 59
End: 2019-11-06

## 2019-11-06 DIAGNOSIS — R06.83 SNORING: Primary | ICD-10-CM

## 2019-11-11 ENCOUNTER — TELEPHONE (OUTPATIENT)
Dept: SLEEP CENTER | Facility: CLINIC | Age: 59
End: 2019-11-11

## 2019-11-11 NOTE — TELEPHONE ENCOUNTER
----- Message from Alessandro Curtis DO sent at 11/7/2019  4:45 PM EST -----  Chart reviewed  Study approved  Schedule HST  Referring provider to provide patient f/u  ----- Message -----  From: Marco Barillas MA  Sent: 11/7/2019   2:55 PM EST  To: Sleep Medicine [facility] Provider    This sleep study needs approval      If approved please sign and return to clerical pool  If denied please include reasons why  Also provide alternative testing if warranted  Please sign and return to clerical pool

## 2019-11-14 DIAGNOSIS — L30.9 DERMATITIS: ICD-10-CM

## 2019-11-14 RX ORDER — BETAMETHASONE DIPROPIONATE 0.5 MG/G
CREAM TOPICAL
Qty: 50 G | Refills: 2 | Status: SHIPPED | OUTPATIENT
Start: 2019-11-14 | End: 2020-08-25 | Stop reason: SDUPTHER

## 2019-12-11 ENCOUNTER — HOSPITAL ENCOUNTER (OUTPATIENT)
Dept: SLEEP CENTER | Facility: CLINIC | Age: 59
Discharge: HOME | End: 2019-12-11
Payer: COMMERCIAL

## 2019-12-11 DIAGNOSIS — R06.83 SNORING: ICD-10-CM

## 2019-12-11 PROCEDURE — G0399 HOME SLEEP TEST/TYPE 3 PORTA: HCPCS

## 2019-12-11 PROCEDURE — G0399 HOME SLEEP TEST/TYPE 3 PORTA: HCPCS | Performed by: PSYCHIATRY & NEUROLOGY

## 2019-12-12 NOTE — PROGRESS NOTES
Home Sleep Study Documentation    Pre-Sleep Home Study:    Set-up and instructions performed by: PHOENIX Schaffer    Technician performed demonstration for Patient: yes    Return demonstration performed by Patient: yes    Written instructions provided to Patient: yes    Patient signed consent form: yes        Post-Sleep Home Study:    Additional comments by Patient: pending    Home Sleep Study Failed:pending    Failure reason: pending    Reported or Detected: pending    Scored by: pending

## 2019-12-19 ENCOUNTER — TELEPHONE (OUTPATIENT)
Dept: FAMILY MEDICINE CLINIC | Facility: CLINIC | Age: 59
End: 2019-12-19

## 2019-12-19 ENCOUNTER — TELEPHONE (OUTPATIENT)
Dept: SLEEP CENTER | Facility: CLINIC | Age: 59
End: 2019-12-19

## 2019-12-19 DIAGNOSIS — G47.33 OSA (OBSTRUCTIVE SLEEP APNEA): Primary | ICD-10-CM

## 2019-12-19 NOTE — TELEPHONE ENCOUNTER
Left message that study has been read & patient can call Dr Barajas Bruni office to discuss results- # provided

## 2019-12-19 NOTE — TELEPHONE ENCOUNTER
----- Message from Georgie Cowan DO sent at 12/19/2019  8:12 AM EST -----  Referring physician to provide follow-up

## 2019-12-20 NOTE — TELEPHONE ENCOUNTER
Patient would like results of sleep study  Results are in Media tab  Please advise  Patient states it's ok to leave detailed message if he doesn't answer

## 2019-12-20 NOTE — TELEPHONE ENCOUNTER
Sleep study does show moderate to severe sleep apnea  The next step is to have a complete study done in the sleep lab to get him set for CPAP  He can call the sleep lab to schedule that at any time

## 2019-12-23 ENCOUNTER — TRANSCRIBE ORDERS (OUTPATIENT)
Dept: ADMINISTRATIVE | Facility: HOSPITAL | Age: 59
End: 2019-12-23

## 2019-12-23 ENCOUNTER — TELEPHONE (OUTPATIENT)
Dept: FAMILY MEDICINE CLINIC | Facility: CLINIC | Age: 59
End: 2019-12-23

## 2019-12-23 DIAGNOSIS — M25.511 RIGHT SHOULDER PAIN, UNSPECIFIED CHRONICITY: Primary | ICD-10-CM

## 2019-12-23 NOTE — TELEPHONE ENCOUNTER
[facility]  [address] [facility] [address]  Phone: [phone]    Name:  Leon Guerra  Current Date: 2017  : 1951  MRN: [id number]   GALILEO: [id number]    Visit Date: 2017  Address: [address] [facility] [address]  Phone: [phone]    Primary Care Provider     Name: Jamal Recinos MD    Phone: [phone]    The staff of [facility] would like to thank you for allowing us to assist you with your healthcare needs. The following includes patient education materials and information on how best to care for your illness/injury at home and when to see a physician. If you need to locate a Doctor or clinic close to you, please call the Doctor Referral Service at [phone]. The Service is available Monday through Thursday from 8 AM to 8 PM and  from 8 AM to 4 PM.    Patients Please Note: If further time off is required, or a medical clearance to return to work is required, it must be obtained through your primary physician.  Return to work clearances and extensions of \"Time-Off\" will not be given by the Emergency Department.     We hope that you leave our Emergency Department believing that we provided you with very good care.   Your Opinion Matters To Us  If you receive a patient satisfaction survey in the mail, please complete and return it in the postage-paid envelope.  We truly value and appreciate your feedback.  Emergency Department Care Providers   Physician:No att. providers found    Advanced Practice Provider:  Physician Assistant: DANNI Shirley     RN_________________ ED Tech__________________ Clerical_________________         Call patient, I agree with Dr Trever Wagner his assessment  His home sleep study shows possible significant apnea  He now needs to go to the sleep lab to get fitted for CPAP machine  They will determine his pressure setting

## 2019-12-23 NOTE — TELEPHONE ENCOUNTER
Patient called back and LM stating he had questions regarding the results of his sleep study  Called patient back and got voicemail  LMOM letting patient know to give us a call back with his questions/concerns

## 2019-12-27 NOTE — TELEPHONE ENCOUNTER
Patient called- questioned why he needs an additional sleep study  Explained results of HST & explained the purpose of titration study- patient verbalizes understanding of same   He will call back to schedule consult & titration study after he has his work schedule- [employer] # also provided

## 2019-12-29 ENCOUNTER — APPOINTMENT (EMERGENCY)
Dept: RADIOLOGY | Facility: HOSPITAL | Age: 59
End: 2019-12-29
Payer: COMMERCIAL

## 2019-12-29 ENCOUNTER — APPOINTMENT (EMERGENCY)
Dept: CT IMAGING | Facility: HOSPITAL | Age: 59
End: 2019-12-29
Payer: COMMERCIAL

## 2019-12-29 ENCOUNTER — HOSPITAL ENCOUNTER (EMERGENCY)
Facility: HOSPITAL | Age: 59
Discharge: HOME | End: 2019-12-29
Attending: EMERGENCY MEDICINE | Admitting: EMERGENCY MEDICINE
Payer: COMMERCIAL

## 2019-12-29 VITALS
HEART RATE: 58 BPM | BODY MASS INDEX: 29.83 KG/M2 | SYSTOLIC BLOOD PRESSURE: 187 MMHG | DIASTOLIC BLOOD PRESSURE: 97 MMHG | OXYGEN SATURATION: 97 % | TEMPERATURE: 98.3 F | WEIGHT: 213.85 LBS

## 2019-12-29 DIAGNOSIS — M71.21 POPLITEAL CYST, RIGHT: Primary | ICD-10-CM

## 2019-12-29 PROCEDURE — 73700 CT LOWER EXTREMITY W/O DYE: CPT

## 2019-12-29 PROCEDURE — 99284 EMERGENCY DEPT VISIT MOD MDM: CPT

## 2019-12-29 PROCEDURE — 99284 EMERGENCY DEPT VISIT MOD MDM: CPT | Performed by: PHYSICIAN ASSISTANT

## 2019-12-29 PROCEDURE — 73562 X-RAY EXAM OF KNEE 3: CPT

## 2019-12-29 RX ORDER — HYDROCODONE BITARTRATE AND ACETAMINOPHEN 5; 325 MG/1; MG/1
1 TABLET ORAL EVERY 8 HOURS PRN
Qty: 9 TABLET | Refills: 0 | Status: SHIPPED | OUTPATIENT
Start: 2019-12-29 | End: 2020-01-01

## 2019-12-29 NOTE — ED PROVIDER NOTES
History  Chief Complaint   Patient presents with    Leg Pain     Pt  has a small "bump" on his right shin  Pt  reports pain coming from area  Pt  reports no swelling or redness to leg  The patient is a 64yo with right knee swelling  Notes the problem has been there for a year but he bumped the area recently and now has a larger area of swelling and severe pain  No weakness or numbness  No redness or drainage  History provided by:  Patient      Prior to Admission Medications   Prescriptions Last Dose Informant Patient Reported? Taking? amLODIPine (NORVASC) 5 mg tablet   No Yes   Sig: Take 1 tablet (5 mg total) by mouth daily   atenolol (TENORMIN) 100 mg tablet  Self No Yes   Sig: TAKE 1 TABLET BY MOUTH EVERY DAY   betamethasone, augmented, (DIPROLENE-AF) 0 05 % cream   No Yes   Sig: APPLY AND RUB IN A THIN FILM TO AFFECTED AREAS TWICE DAILY  (AM AND PM)  Patient taking differently: as needed    sildenafil (VIAGRA) 100 mg tablet  Self Yes No   Sig: Take 100 mg by mouth as needed    zolpidem (AMBIEN) 10 mg tablet   No Yes   Sig: Take 1 tablet (10 mg total) by mouth daily at bedtime as needed for sleep      Facility-Administered Medications: None       Past Medical History:   Diagnosis Date    Hypertension        Past Surgical History:   Procedure Laterality Date    HYDROCELE EXCISION / REPAIR Right 02/11/2013    spermatic cord excision of hydrocele       Family History   Problem Relation Age of Onset    No Known Problems Mother     Kidney failure Father      I have reviewed and agree with the history as documented  Social History     Tobacco Use    Smoking status: Never Smoker    Smokeless tobacco: Current User    Tobacco comment: current smoker ( as per allscripts)   Substance Use Topics    Alcohol use: Yes     Comment: beer consumption (3 bottles per day)    Drug use: No        Review of Systems   Constitutional: Negative for activity change, appetite change and fatigue     HENT: Negative for nosebleeds, sneezing, sore throat, trouble swallowing and voice change  Eyes: Negative for photophobia, pain and visual disturbance  Respiratory: Negative for apnea, choking and stridor  Cardiovascular: Negative for palpitations and leg swelling  Gastrointestinal: Negative for anal bleeding and constipation  Endocrine: Negative for cold intolerance, heat intolerance, polydipsia and polyphagia  Genitourinary: Negative for decreased urine volume, enuresis, frequency, genital sores and urgency  Musculoskeletal: Negative for joint swelling and myalgias  Allergic/Immunologic: Negative for environmental allergies and food allergies  Neurological: Negative for tremors, seizures, speech difficulty and weakness  Hematological: Negative for adenopathy  Psychiatric/Behavioral: Negative for behavioral problems, decreased concentration, dysphoric mood and hallucinations  All other systems reviewed and are negative  Physical Exam  Physical Exam   Constitutional: He is oriented to person, place, and time  He appears well-developed and well-nourished  No distress  HENT:   Head: Normocephalic and atraumatic  Right Ear: External ear normal    Left Ear: External ear normal    Nose: Nose normal    Mouth/Throat: Oropharynx is clear and moist    Eyes: Pupils are equal, round, and reactive to light  Conjunctivae and EOM are normal    Neck: Normal range of motion  Neck supple  Cardiovascular: Normal rate, regular rhythm and normal heart sounds  Exam reveals no gallop and no friction rub  No murmur heard  Pulmonary/Chest: Effort normal and breath sounds normal  No respiratory distress  He has no wheezes  Abdominal: Soft  Bowel sounds are normal    Musculoskeletal: He exhibits tenderness  He exhibits no deformity  Legs:  Neurological: He is alert and oriented to person, place, and time  Skin: Skin is warm and dry  He is not diaphoretic  Psychiatric: He has a normal mood and affect   His behavior is normal    Vitals reviewed  Vital Signs  ED Triage Vitals [12/29/19 1322]   Temperature Pulse Resp Blood Pressure SpO2   98 3 °F (36 8 °C) 58 -- (!) 187/97 97 %      Temp Source Heart Rate Source Patient Position - Orthostatic VS BP Location FiO2 (%)   Temporal Monitor Sitting Right arm --      Pain Score       6           Vitals:    12/29/19 1322   BP: (!) 187/97   Pulse: 58   Patient Position - Orthostatic VS: Sitting         Visual Acuity      ED Medications  Medications - No data to display    Diagnostic Studies  Results Reviewed     None                 No orders to display              Procedures  Procedures         ED Course                               MDM  Number of Diagnoses or Management Options  Popliteal cyst, right:   Diagnosis management comments: Patient prefers discharge and will see his Ortho at [facility] tomorrow  Does not wish to wait for Nico Neely consult  Provided our referral to him if he changes his mind  Will also provide Norco for pain  Patient is understanding with plan and in agreement  Disposition  Final diagnoses:   None     ED Disposition     None      Follow-up Information    None         Patient's Medications   Discharge Prescriptions    No medications on file     No discharge procedures on file      ED Provider  Electronically Signed by           Chico Seymour PA-C  01/11/20 5225

## 2019-12-30 ENCOUNTER — HOSPITAL ENCOUNTER (OUTPATIENT)
Dept: MRI IMAGING | Facility: HOSPITAL | Age: 59
Discharge: HOME | End: 2019-12-30
Attending: ORTHOPAEDIC SURGERY
Payer: COMMERCIAL

## 2019-12-30 DIAGNOSIS — M25.511 RIGHT SHOULDER PAIN, UNSPECIFIED CHRONICITY: ICD-10-CM

## 2019-12-30 PROCEDURE — 73221 MRI JOINT UPR EXTREM W/O DYE: CPT

## 2020-01-09 DIAGNOSIS — I10 ESSENTIAL HYPERTENSION: ICD-10-CM

## 2020-01-09 RX ORDER — ATENOLOL 100 MG/1
TABLET ORAL
Qty: 90 TABLET | Refills: 0 | Status: SHIPPED | OUTPATIENT
Start: 2020-01-09 | End: 2020-01-14 | Stop reason: SDUPTHER

## 2020-01-10 DIAGNOSIS — G47.30 SLEEP APNEA, UNSPECIFIED TYPE: ICD-10-CM

## 2020-01-11 RX ORDER — ZOLPIDEM TARTRATE 10 MG/1
TABLET ORAL
Qty: 90 TABLET | Refills: 0 | Status: SHIPPED | OUTPATIENT
Start: 2020-01-11 | End: 2020-01-14 | Stop reason: SDUPTHER

## 2020-01-13 DIAGNOSIS — G47.30 SLEEP APNEA, UNSPECIFIED TYPE: ICD-10-CM

## 2020-01-13 DIAGNOSIS — I10 ESSENTIAL HYPERTENSION: ICD-10-CM

## 2020-01-13 NOTE — TELEPHONE ENCOUNTER
Fax came thru [facility]    Atenolol 100 mg tab  Take 1 tab daily   [facility]     Just refilled 1/9/2020 # 90 [facility] [address]    Zolpidem  Tart 10 mg tab  Take 1 tab @ hs  As needed for sleep   #90  [facility]    Just refilled 1/11/2020  #90 [facility] [address]

## 2020-01-14 NOTE — TELEPHONE ENCOUNTER
Checked PDMP: Last fill 1/11/2020 for 30, cannot refill until 2/11/2020  Called [facility] to cancel both Rx's since pt is requesting for them to be sent to mail order

## 2020-01-15 RX ORDER — ATENOLOL 100 MG/1
100 TABLET ORAL DAILY
Qty: 90 TABLET | Refills: 1 | Status: SHIPPED | OUTPATIENT
Start: 2020-01-15 | End: 2020-08-17

## 2020-01-15 RX ORDER — ZOLPIDEM TARTRATE 10 MG/1
10 TABLET ORAL
Qty: 90 TABLET | Refills: 0 | Status: SHIPPED | OUTPATIENT
Start: 2020-02-11 | End: 2020-04-13 | Stop reason: SDUPTHER

## 2020-03-05 DIAGNOSIS — I10 BENIGN ESSENTIAL HYPERTENSION: ICD-10-CM

## 2020-03-05 RX ORDER — AMLODIPINE BESYLATE 5 MG/1
TABLET ORAL
Qty: 90 TABLET | Refills: 1 | Status: SHIPPED | OUTPATIENT
Start: 2020-03-05 | End: 2020-03-18 | Stop reason: SDUPTHER

## 2020-03-18 ENCOUNTER — TELEPHONE (OUTPATIENT)
Dept: FAMILY MEDICINE CLINIC | Facility: CLINIC | Age: 60
End: 2020-03-18

## 2020-03-18 DIAGNOSIS — I10 BENIGN ESSENTIAL HYPERTENSION: ICD-10-CM

## 2020-03-18 RX ORDER — AMLODIPINE BESYLATE 5 MG/1
5 TABLET ORAL DAILY
Qty: 90 TABLET | Refills: 1 | Status: SHIPPED | OUTPATIENT
Start: 2020-03-18 | End: 2020-03-21 | Stop reason: SDUPTHER

## 2020-03-18 NOTE — TELEPHONE ENCOUNTER
Fax came thru [facility]    Amlodipine besylate 5 mg tabs  Take 1 tab daily  #[facility]      Recently sent refill to [facility] 3/5/2020 for 90 days

## 2020-03-21 DIAGNOSIS — I10 BENIGN ESSENTIAL HYPERTENSION: ICD-10-CM

## 2020-03-21 RX ORDER — AMLODIPINE BESYLATE 5 MG/1
5 TABLET ORAL DAILY
Qty: 90 TABLET | Refills: 1 | Status: SHIPPED | OUTPATIENT
Start: 2020-03-21 | End: 2020-08-29

## 2020-04-13 DIAGNOSIS — G47.30 SLEEP APNEA, UNSPECIFIED TYPE: ICD-10-CM

## 2020-04-13 RX ORDER — ZOLPIDEM TARTRATE 10 MG/1
10 TABLET ORAL
Qty: 90 TABLET | Refills: 0 | Status: SHIPPED | OUTPATIENT
Start: 2020-04-13 | End: 2020-04-24 | Stop reason: SDUPTHER

## 2020-04-24 DIAGNOSIS — G47.30 SLEEP APNEA, UNSPECIFIED TYPE: ICD-10-CM

## 2020-04-24 RX ORDER — ZOLPIDEM TARTRATE 10 MG/1
10 TABLET ORAL
Qty: 90 TABLET | Refills: 0 | Status: SHIPPED | OUTPATIENT
Start: 2020-04-24 | End: 2020-05-06 | Stop reason: SDUPTHER

## 2020-05-06 ENCOUNTER — OFFICE VISIT (OUTPATIENT)
Dept: FAMILY MEDICINE CLINIC | Facility: CLINIC | Age: 60
End: 2020-05-06
Payer: COMMERCIAL

## 2020-05-06 VITALS
DIASTOLIC BLOOD PRESSURE: 84 MMHG | SYSTOLIC BLOOD PRESSURE: 136 MMHG | RESPIRATION RATE: 17 BRPM | HEIGHT: 71 IN | BODY MASS INDEX: 31.92 KG/M2 | HEART RATE: 60 BPM | WEIGHT: 228 LBS | OXYGEN SATURATION: 97 % | TEMPERATURE: 97 F

## 2020-05-06 DIAGNOSIS — G47.00 INSOMNIA, UNSPECIFIED TYPE: ICD-10-CM

## 2020-05-06 DIAGNOSIS — G47.30 SLEEP APNEA, UNSPECIFIED TYPE: ICD-10-CM

## 2020-05-06 DIAGNOSIS — Z13.220 SCREENING FOR CHOLESTEROL LEVEL: ICD-10-CM

## 2020-05-06 DIAGNOSIS — I10 BENIGN ESSENTIAL HYPERTENSION: Primary | ICD-10-CM

## 2020-05-06 DIAGNOSIS — Z12.5 SCREENING FOR PROSTATE CANCER: ICD-10-CM

## 2020-05-06 DIAGNOSIS — Z13.1 SCREENING FOR DIABETES MELLITUS: ICD-10-CM

## 2020-05-06 DIAGNOSIS — G43.109 MIGRAINE WITH AURA AND WITHOUT STATUS MIGRAINOSUS, NOT INTRACTABLE: ICD-10-CM

## 2020-05-06 DIAGNOSIS — R06.83 SNORING: ICD-10-CM

## 2020-05-06 PROCEDURE — 99214 OFFICE O/P EST MOD 30 MIN: CPT | Performed by: FAMILY MEDICINE

## 2020-05-06 PROCEDURE — 3075F SYST BP GE 130 - 139MM HG: CPT | Performed by: FAMILY MEDICINE

## 2020-05-06 PROCEDURE — 3008F BODY MASS INDEX DOCD: CPT | Performed by: FAMILY MEDICINE

## 2020-05-06 PROCEDURE — 3079F DIAST BP 80-89 MM HG: CPT | Performed by: FAMILY MEDICINE

## 2020-05-06 RX ORDER — ZOLPIDEM TARTRATE 10 MG/1
10 TABLET ORAL
Qty: 90 TABLET | Refills: 1 | Status: SHIPPED | OUTPATIENT
Start: 2020-05-06 | End: 2020-11-16 | Stop reason: SDUPTHER

## 2020-08-17 DIAGNOSIS — I10 ESSENTIAL HYPERTENSION: ICD-10-CM

## 2020-08-17 RX ORDER — ATENOLOL 100 MG/1
TABLET ORAL
Qty: 90 TABLET | Refills: 3 | Status: SHIPPED | OUTPATIENT
Start: 2020-08-17 | End: 2021-08-04

## 2020-08-25 DIAGNOSIS — L30.9 DERMATITIS: ICD-10-CM

## 2020-08-25 RX ORDER — BETAMETHASONE DIPROPIONATE 0.5 MG/G
CREAM TOPICAL 2 TIMES DAILY
Qty: 50 G | Refills: 2 | Status: SHIPPED | OUTPATIENT
Start: 2020-08-25 | End: 2020-11-15

## 2020-08-25 NOTE — TELEPHONE ENCOUNTER
Phone call from Romana Gondola at [facility]      Last OV 5/6/20  Next OV 11/6/20
I have reviewed and confirmed nurses' notes...

## 2020-08-29 DIAGNOSIS — I10 BENIGN ESSENTIAL HYPERTENSION: ICD-10-CM

## 2020-08-29 RX ORDER — AMLODIPINE BESYLATE 5 MG/1
TABLET ORAL
Qty: 90 TABLET | Refills: 3 | Status: SHIPPED | OUTPATIENT
Start: 2020-08-29 | End: 2021-07-18

## 2020-11-14 DIAGNOSIS — L30.9 DERMATITIS: ICD-10-CM

## 2020-11-15 RX ORDER — BETAMETHASONE DIPROPIONATE 0.5 MG/G
CREAM TOPICAL
Qty: 50 G | Refills: 13 | Status: SHIPPED | OUTPATIENT
Start: 2020-11-15 | End: 2021-12-29

## 2020-11-16 DIAGNOSIS — G47.30 SLEEP APNEA, UNSPECIFIED TYPE: ICD-10-CM

## 2020-11-16 RX ORDER — ZOLPIDEM TARTRATE 10 MG/1
10 TABLET ORAL
Qty: 90 TABLET | Refills: 1 | Status: SHIPPED | OUTPATIENT
Start: 2020-11-16 | End: 2021-04-09

## 2021-01-21 ENCOUNTER — TELEPHONE (OUTPATIENT)
Dept: SLEEP CENTER | Facility: CLINIC | Age: 61
End: 2021-01-21

## 2021-01-22 NOTE — TELEPHONE ENCOUNTER
Returned the patient call he is asking for his sleep study results to go to his new provider     Advised he needs to supply providers name ,phone number and fax number

## 2021-02-27 ENCOUNTER — TELEMEDICINE (OUTPATIENT)
Dept: FAMILY MEDICINE CLINIC | Facility: CLINIC | Age: 61
End: 2021-02-27
Payer: COMMERCIAL

## 2021-02-27 DIAGNOSIS — G47.00 INSOMNIA, UNSPECIFIED TYPE: ICD-10-CM

## 2021-02-27 DIAGNOSIS — G43.109 MIGRAINE WITH AURA AND WITHOUT STATUS MIGRAINOSUS, NOT INTRACTABLE: ICD-10-CM

## 2021-02-27 DIAGNOSIS — G47.33 OSA (OBSTRUCTIVE SLEEP APNEA): ICD-10-CM

## 2021-02-27 DIAGNOSIS — I10 BENIGN ESSENTIAL HYPERTENSION: Primary | ICD-10-CM

## 2021-02-27 DIAGNOSIS — J30.1 ALLERGIC RHINITIS DUE TO POLLEN, UNSPECIFIED SEASONALITY: ICD-10-CM

## 2021-02-27 PROCEDURE — 99214 OFFICE O/P EST MOD 30 MIN: CPT | Performed by: FAMILY MEDICINE

## 2021-02-27 PROCEDURE — 1036F TOBACCO NON-USER: CPT | Performed by: FAMILY MEDICINE

## 2021-02-27 RX ORDER — AZELASTINE HYDROCHLORIDE 0.5 MG/ML
1 SOLUTION/ DROPS OPHTHALMIC 2 TIMES DAILY
Qty: 5 ML | Refills: 5 | Status: SHIPPED | OUTPATIENT
Start: 2021-02-27 | End: 2021-07-12 | Stop reason: SDUPTHER

## 2021-02-27 NOTE — PROGRESS NOTES
Virtual Regular Visit      Assessment/Plan:    Problem List Items Addressed This Visit     Benign essential hypertension - Primary      Patient did not purchased home blood pressure monitor yet  I strongly encouraged that he do so continue atenolol 100 and amlodipine 5         Classic migraine with aura      Doing well since discontinuing OxyContin and hydrocodone         Insomnia      Doing well on zolpidem 10 mg nightly without side effects or falls         BARRY (obstructive sleep apnea)      Patient will be getting his CPAP machine on March 12th  Allergic rhinitis due to pollen      Will give Rx for azelastine eye drops  Call further problems         Relevant Medications    azelastine (OPTIVAR) 0 05 % ophthalmic solution          Patient due/overdue for fasting blood work  He states he will get this done shortly (at [facility])    6 mos       Reason for visit is No chief complaint on file  Encounter provider Samuel Chapman DO    Provider located at [facility] 8  [address] [facility] [place] [address] [place] [address]-[facility] [address]  [phone]      Recent Visits  No visits were found meeting these conditions  Showing recent visits within past 7 days and meeting all other requirements     Today's Visits  Date Type Provider Dept   02/27/21 [place] [id number] Chad Peterson today's visits and meeting all other requirements     Future Appointments  No visits were found meeting these conditions  Showing future appointments within next 150 days and meeting all other requirements        The patient was identified by name and date of birth  Juan Janneth was informed that this is a telemedicine visit and that the visit is being conducted through [facility] and patient was informed that this is a secure, HIPAA-compliant platform  He agrees to proceed     My office door was closed  No one else was in the room    He acknowledged consent and understanding of privacy and security of the video platform  The patient has agreed to participate and understands they can discontinue the visit at any time  Patient is aware this is a billable service  Subjective  Alvaro Pascal is a 62 y o  male see HPI   Patient presents for virtual recheck chronic medical problems  Doing well since moving down to [place]  Has been experiencing considerable allergy symptoms and is requesting something to be prescribed for his eyes  Otherwise doing well  Doing well on atenolol and amlodipine for blood pressure  Doing well on zolpidem for insomnia  Patient will be getting CPAP in a few weeks  Past Medical History:   Diagnosis Date    Hypertension        Past Surgical History:   Procedure Laterality Date    HYDROCELE EXCISION / REPAIR Right 02/11/2013    spermatic cord excision of hydrocele       Current Outpatient Medications   Medication Sig Dispense Refill    amLODIPine (NORVASC) 5 mg tablet TAKE 1 TABLET DAILY 90 tablet 3    atenolol (TENORMIN) 100 mg tablet TAKE 1 TABLET DAILY 90 tablet 3    azelastine (OPTIVAR) 0 05 % ophthalmic solution Administer 1 drop to both eyes 2 (two) times a day 5 mL 5    betamethasone, augmented, (DIPROLENE-AF) 0 05 % cream APPLY TOPICALLY TWICE A DAY 50 g 13    sildenafil (VIAGRA) 100 mg tablet Take 100 mg by mouth as needed       zolpidem (AMBIEN) 10 mg tablet Take 1 tablet (10 mg total) by mouth daily at bedtime as needed for sleep 90 tablet 1     No current facility-administered medications for this visit  No Known Allergies    Review of Systems   Eyes: Positive for discharge, redness and itching  Respiratory: Negative  Cardiovascular: Negative  Gastrointestinal: Negative  Genitourinary: Negative  Video Exam    There were no vitals filed for this visit      Physical Exam     I spent 25 minutes directly with the patient during this visit      VIRTUAL VISIT DISCLAIMER    Alvaro Pascal acknowledges that he has consented to an online visit or consultation  He understands that the online visit is based solely on information provided by him, and that, in the absence of a face-to-face physical evaluation by the physician, the diagnosis he receives is both limited and provisional in terms of accuracy and completeness  This is not intended to replace a full medical face-to-face evaluation by the physician  Gera Villanueva understands and accepts these terms

## 2021-02-27 NOTE — ASSESSMENT & PLAN NOTE
Patient did not purchased home blood pressure monitor yet    I strongly encouraged that he do so continue atenolol 100 and amlodipine 5

## 2021-03-19 LAB
ALBUMIN SERPL-MCNC: 4.7 G/DL (ref 3.8–4.9)
ALBUMIN/GLOB SERPL: 1.6 {RATIO} (ref 1.2–2.2)
ALP SERPL-CCNC: 55 IU/L (ref 39–117)
ALT SERPL-CCNC: 27 IU/L (ref 0–44)
AST SERPL-CCNC: 22 IU/L (ref 0–40)
BASOPHILS # BLD AUTO: 0.1 X10E3/UL (ref 0–0.2)
BASOPHILS NFR BLD AUTO: 1 %
BILIRUB SERPL-MCNC: 0.6 MG/DL (ref 0–1.2)
BUN SERPL-MCNC: 19 MG/DL (ref 8–27)
BUN/CREAT SERPL: 16 (ref 10–24)
CALCIUM SERPL-MCNC: 9.4 MG/DL (ref 8.6–10.2)
CHLORIDE SERPL-SCNC: 99 MMOL/L (ref 96–106)
CHOLEST SERPL-MCNC: 215 MG/DL (ref 100–199)
CO2 SERPL-SCNC: 23 MMOL/L (ref 20–29)
CREAT SERPL-MCNC: 1.17 MG/DL (ref 0.76–1.27)
EOSINOPHIL # BLD AUTO: 0.3 X10E3/UL (ref 0–0.4)
EOSINOPHIL NFR BLD AUTO: 6 %
ERYTHROCYTE [DISTWIDTH] IN BLOOD BY AUTOMATED COUNT: 13.7 % (ref 11.6–15.4)
GLOBULIN SER-MCNC: 2.9 G/DL (ref 1.5–4.5)
GLUCOSE SERPL-MCNC: 107 MG/DL (ref 65–99)
HBA1C MFR BLD: 5.7 % (ref 4.8–5.6)
HCT VFR BLD AUTO: 45.4 % (ref 37.5–51)
HDLC SERPL-MCNC: 42 MG/DL
HGB BLD-MCNC: 15.2 G/DL (ref 13–17.7)
IMM GRANULOCYTES # BLD: 0 X10E3/UL (ref 0–0.1)
IMM GRANULOCYTES NFR BLD: 0 %
LDLC SERPL CALC-MCNC: 132 MG/DL (ref 0–99)
LDLC SERPL DIRECT ASSAY-MCNC: 134 MG/DL (ref 0–99)
LYMPHOCYTES # BLD AUTO: 1.4 X10E3/UL (ref 0.7–3.1)
LYMPHOCYTES NFR BLD AUTO: 29 %
MCH RBC QN AUTO: 31.1 PG (ref 26.6–33)
MCHC RBC AUTO-ENTMCNC: 33.5 G/DL (ref 31.5–35.7)
MCV RBC AUTO: 93 FL (ref 79–97)
MONOCYTES # BLD AUTO: 0.5 X10E3/UL (ref 0.1–0.9)
MONOCYTES NFR BLD AUTO: 9 %
NEUTROPHILS # BLD AUTO: 2.7 X10E3/UL (ref 1.4–7)
NEUTROPHILS NFR BLD AUTO: 55 %
POTASSIUM SERPL-SCNC: 4.2 MMOL/L (ref 3.5–5.2)
PROT SERPL-MCNC: 7.6 G/DL (ref 6–8.5)
PSA SERPL-MCNC: 0.4 NG/ML (ref 0–4)
RBC # BLD AUTO: 4.89 X10E6/UL (ref 4.14–5.8)
SL AMB EGFR AFRICAN AMERICAN: 78 ML/MIN/1.73
SL AMB EGFR NON AFRICAN AMERICAN: 67 ML/MIN/1.73
SL AMB VLDL CHOLESTEROL CALC: 41 MG/DL (ref 5–40)
SODIUM SERPL-SCNC: 137 MMOL/L (ref 134–144)
TRIGL SERPL-MCNC: 228 MG/DL (ref 0–149)
TSH SERPL DL<=0.005 MIU/L-ACNC: 1.31 UIU/ML (ref 0.45–4.5)
WBC # BLD AUTO: 4.9 X10E3/UL (ref 3.4–10.8)

## 2021-03-22 ENCOUNTER — TELEPHONE (OUTPATIENT)
Dept: FAMILY MEDICINE CLINIC | Facility: CLINIC | Age: 61
End: 2021-03-22

## 2021-03-22 NOTE — TELEPHONE ENCOUNTER
----- Message from Hiral Alejandre DO sent at 3/19/2021  1:09 PM EDT -----  Call patient, with lab results  His blood sugar was a little high at 107 (normal is less than 100)  Cholesterol is mildly elevated at 215 ( normal less than 200)  Remainder of labs looked good (including thyroid prostate, kidney and liver )  Recommend watch sweets in diet and reduce animal based fats    Recheck labs in 1 year

## 2021-04-08 DIAGNOSIS — G47.30 SLEEP APNEA, UNSPECIFIED TYPE: ICD-10-CM

## 2021-04-09 RX ORDER — ZOLPIDEM TARTRATE 10 MG/1
TABLET ORAL
Qty: 90 TABLET | Refills: 1 | Status: SHIPPED | OUTPATIENT
Start: 2021-04-09 | End: 2021-10-13 | Stop reason: SDUPTHER

## 2021-07-12 DIAGNOSIS — J30.1 ALLERGIC RHINITIS DUE TO POLLEN, UNSPECIFIED SEASONALITY: ICD-10-CM

## 2021-07-12 RX ORDER — AZELASTINE HYDROCHLORIDE 0.5 MG/ML
1 SOLUTION/ DROPS OPHTHALMIC 2 TIMES DAILY
Qty: 5 ML | Refills: 5 | Status: SHIPPED | OUTPATIENT
Start: 2021-07-12 | End: 2022-03-02 | Stop reason: SDUPTHER

## 2021-07-18 DIAGNOSIS — I10 BENIGN ESSENTIAL HYPERTENSION: ICD-10-CM

## 2021-07-18 RX ORDER — AMLODIPINE BESYLATE 5 MG/1
TABLET ORAL
Qty: 90 TABLET | Refills: 3 | Status: SHIPPED | OUTPATIENT
Start: 2021-07-18 | End: 2022-03-02 | Stop reason: SDUPTHER

## 2021-07-24 ENCOUNTER — TELEMEDICINE (OUTPATIENT)
Dept: FAMILY MEDICINE CLINIC | Facility: CLINIC | Age: 61
End: 2021-07-24
Payer: COMMERCIAL

## 2021-07-24 VITALS — SYSTOLIC BLOOD PRESSURE: 145 MMHG | DIASTOLIC BLOOD PRESSURE: 80 MMHG

## 2021-07-24 DIAGNOSIS — G43.109 MIGRAINE WITH AURA AND WITHOUT STATUS MIGRAINOSUS, NOT INTRACTABLE: ICD-10-CM

## 2021-07-24 DIAGNOSIS — I10 BENIGN ESSENTIAL HYPERTENSION: Primary | ICD-10-CM

## 2021-07-24 DIAGNOSIS — G47.00 INSOMNIA, UNSPECIFIED TYPE: ICD-10-CM

## 2021-07-24 DIAGNOSIS — L30.9 DERMATITIS: ICD-10-CM

## 2021-07-24 DIAGNOSIS — J30.1 ALLERGIC RHINITIS DUE TO POLLEN, UNSPECIFIED SEASONALITY: ICD-10-CM

## 2021-07-24 DIAGNOSIS — G47.33 OBSTRUCTIVE SLEEP APNEA: ICD-10-CM

## 2021-07-24 PROCEDURE — 3077F SYST BP >= 140 MM HG: CPT | Performed by: FAMILY MEDICINE

## 2021-07-24 PROCEDURE — 1036F TOBACCO NON-USER: CPT | Performed by: FAMILY MEDICINE

## 2021-07-24 PROCEDURE — 99214 OFFICE O/P EST MOD 30 MIN: CPT | Performed by: FAMILY MEDICINE

## 2021-07-24 PROCEDURE — 3079F DIAST BP 80-89 MM HG: CPT | Performed by: FAMILY MEDICINE

## 2021-07-24 NOTE — ASSESSMENT & PLAN NOTE
Still with diffuse rash  Been using betamethasone cream with some improvement  He also has eliminated diet soda, which has improved the problem as well  Long discussion with Kiah Perdue today regarding minimizing long-term use of topical steroids  Consider adding OTC Zyrtec    Also consider referral to dermatologist if not improving

## 2021-07-24 NOTE — PROGRESS NOTES
Virtual Regular Visit    Verification of patient location:    Patient is currently located in the state [facility]  Patient not located in a state that I am licensed, however I am treating under current waivers and flexibilities to treat patients via telehealth outside of my state    Assessment/Plan:    Problem List Items Addressed This Visit     Benign essential hypertension - Primary      Patient has been taking home blood pressures  This morning 145/80  Will maintain atenolol 100 amlodipine 5 for now  Continue home monitoring  Consider increasing amlodipine or adding a 3rd agent if needed by next visit if not improving         Classic migraine with aura      Has been stable since discontinuing OxyContin and hydrocodone         Insomnia      Doing well on zolpidem 10 mg nightly without side effects or falls         Obstructive sleep apnea      Using CPAP         Allergic rhinitis due to pollen    Dermatitis     Still with diffuse rash  Been using betamethasone cream with some improvement  He also has eliminated diet soda, which has improved the problem as well  Long discussion with Adalberto Mauro today regarding minimizing long-term use of topical steroids  Consider adding OTC Zyrtec  Also consider referral to dermatologist if not improving            due for colonoscopy  Patient states he will schedule near future        SIX MONTHS ( IN-PERSON VISIT)  Patient will be due for yearly labs after 3/17/2022       Reason for visit is No chief complaint on file  Encounter provider Lynn Borrego DO    Provider located at [facility] 8  [address] [facility] [address] [place] [address] 83  [phone]      Recent Visits  No visits were found meeting these conditions    Showing recent visits within past 7 days and meeting all other requirements  Today's Visits  Date Type Provider Dept   07/24/21 Telemedicine Lynn Borrego DO [facility]   Showing today's visits and meeting all other requirements  Future Appointments  No visits were found meeting these conditions  Showing future appointments within next 150 days and meeting all other requirements       The patient was identified by name and date of birth  Kimberly Willoughby was informed that this is a telemedicine visit and that the visit is being conducted through [address] and patient was informed that this is a secure, HIPAA-compliant platform  He agrees to proceed     My office door was closed  No one else was in the room  He acknowledged consent and understanding of privacy and security of the video platform  The patient has agreed to participate and understands they can discontinue the visit at any time  Patient is aware this is a billable service  Subjective  Kimberly Willoughby is a 62 y o  male See HPI   Patient presents for virtual med check appointment today  Overall he is doing well  Still with diffuse rash  Been using betamethasone cream with some improvement  He also has eliminated diet soda, which has improved the problem as well  Otherwise he is doing well on atenolol and amlodipine for blood pressure  Doing well on zolpidem for insomnia         Past Medical History:   Diagnosis Date    Hypertension        Past Surgical History:   Procedure Laterality Date    HYDROCELE EXCISION / REPAIR Right 02/11/2013    spermatic cord excision of hydrocele       Current Outpatient Medications   Medication Sig Dispense Refill    amLODIPine (NORVASC) 5 mg tablet TAKE 1 TABLET DAILY 90 tablet 3    atenolol (TENORMIN) 100 mg tablet TAKE 1 TABLET DAILY 90 tablet 3    azelastine (OPTIVAR) 0 05 % ophthalmic solution Administer 1 drop to both eyes 2 (two) times a day 5 mL 5    betamethasone, augmented, (DIPROLENE-AF) 0 05 % cream APPLY TOPICALLY TWICE A DAY 50 g 13    sildenafil (VIAGRA) 100 mg tablet Take 100 mg by mouth as needed       zolpidem (AMBIEN) 10 mg tablet TAKE 1 TABLET DAILY AT BEDTIME AS NEEDED FOR SLEEP 90 tablet 1     No current facility-administered medications for this visit  No Known Allergies    Review of Systems   Respiratory: Negative  Cardiovascular: Negative  Gastrointestinal: Negative  Genitourinary: Negative  Skin: Positive for rash  Video Exam    Vitals:    07/24/21 1050   BP: 145/80       Physical Exam  Cardiovascular:      Rate and Rhythm: Normal rate and regular rhythm  Heart sounds: Normal heart sounds  Comments: Carotids: no bruits  Ext: no edema  Pulmonary:      Effort: Pulmonary effort is normal  No respiratory distress  Breath sounds: No wheezing or rales  Psychiatric:         Behavior: Behavior normal          Thought Content: Thought content normal           I spent Twenty-five minutes directly with the patient during this visit    VIRTUAL VISIT DISCLAIMER      Greg Newman verbally agrees to participate in [employer]  Pt is aware that [employer] could be limited without vital signs or the ability to perform a full hands-on physical exam  Paulo Russell understands he or the provider may request at any time to terminate the video visit and request the patient to seek care or treatment in person

## 2021-07-24 NOTE — ASSESSMENT & PLAN NOTE
Patient has been taking home blood pressures  This morning 145/80  Will maintain atenolol 100 amlodipine 5 for now  Continue home monitoring    Consider increasing amlodipine or adding a 3rd agent if needed by next visit if not improving

## 2021-08-04 DIAGNOSIS — I10 ESSENTIAL HYPERTENSION: ICD-10-CM

## 2021-08-04 RX ORDER — ATENOLOL 100 MG/1
TABLET ORAL
Qty: 90 TABLET | Refills: 3 | Status: SHIPPED | OUTPATIENT
Start: 2021-08-04 | End: 2022-03-02 | Stop reason: SDUPTHER

## 2021-10-13 DIAGNOSIS — G47.30 SLEEP APNEA, UNSPECIFIED TYPE: ICD-10-CM

## 2021-10-14 RX ORDER — ZOLPIDEM TARTRATE 10 MG/1
10 TABLET ORAL
Qty: 90 TABLET | Refills: 1 | Status: SHIPPED | OUTPATIENT
Start: 2021-10-14 | End: 2022-03-26 | Stop reason: SDUPTHER

## 2021-12-28 DIAGNOSIS — L30.9 DERMATITIS: ICD-10-CM

## 2021-12-29 RX ORDER — BETAMETHASONE DIPROPIONATE 0.5 MG/G
CREAM TOPICAL
Qty: 50 G | Refills: 13 | Status: SHIPPED | OUTPATIENT
Start: 2021-12-29 | End: 2022-03-02 | Stop reason: SDUPTHER

## 2022-02-05 ENCOUNTER — TELEMEDICINE (OUTPATIENT)
Dept: FAMILY MEDICINE CLINIC | Facility: CLINIC | Age: 62
End: 2022-02-05
Payer: COMMERCIAL

## 2022-02-05 ENCOUNTER — TELEPHONE (OUTPATIENT)
Dept: FAMILY MEDICINE CLINIC | Facility: CLINIC | Age: 62
End: 2022-02-05

## 2022-02-05 DIAGNOSIS — G43.109 MIGRAINE WITH AURA AND WITHOUT STATUS MIGRAINOSUS, NOT INTRACTABLE: ICD-10-CM

## 2022-02-05 DIAGNOSIS — R07.89 ATYPICAL CHEST PAIN: ICD-10-CM

## 2022-02-05 DIAGNOSIS — Z13.1 SCREENING FOR DIABETES MELLITUS: ICD-10-CM

## 2022-02-05 DIAGNOSIS — G47.33 OSA (OBSTRUCTIVE SLEEP APNEA): ICD-10-CM

## 2022-02-05 DIAGNOSIS — R06.00 EXERTIONAL DYSPNEA: ICD-10-CM

## 2022-02-05 DIAGNOSIS — K64.9 HEMORRHOIDS, UNSPECIFIED HEMORRHOID TYPE: ICD-10-CM

## 2022-02-05 DIAGNOSIS — Z12.5 SCREENING FOR PROSTATE CANCER: ICD-10-CM

## 2022-02-05 DIAGNOSIS — I10 BENIGN ESSENTIAL HYPERTENSION: ICD-10-CM

## 2022-02-05 DIAGNOSIS — G47.00 INSOMNIA, UNSPECIFIED TYPE: Primary | ICD-10-CM

## 2022-02-05 DIAGNOSIS — Z13.220 SCREENING CHOLESTEROL LEVEL: ICD-10-CM

## 2022-02-05 PROBLEM — R06.09 EXERTIONAL DYSPNEA: Status: ACTIVE | Noted: 2022-02-05

## 2022-02-05 PROCEDURE — 1036F TOBACCO NON-USER: CPT | Performed by: FAMILY MEDICINE

## 2022-02-05 PROCEDURE — 99214 OFFICE O/P EST MOD 30 MIN: CPT | Performed by: FAMILY MEDICINE

## 2022-02-05 NOTE — ASSESSMENT & PLAN NOTE
History of obstructive sleep apnea  Patient tried CPAP without benefit  He discontinued use   He is currently sleeping well without next day fatigue

## 2022-02-05 NOTE — TELEPHONE ENCOUNTER
----- Message from Sheryl Williamson DO sent at 2/5/2022 11:31 AM EST -----   Please call patient to set up appointment with me on the next Saturday that I work   Thank you    BL

## 2022-02-05 NOTE — PROGRESS NOTES
Virtual Regular Visit    Verification of patient location:    Patient is located in the following state in which I hold an active license [place]      Assessment/Plan:    Problem List Items Addressed This Visit     Benign essential hypertension      History of borderline control  Patient has not been checking home blood pressures lately  He is compliant with atenolol 100 and amlodipine 5 mg daily  I encouraged him to start monitoring home pressures  I also would like to see him in the office  In near future to check his blood pressure         Relevant Orders    CBC and differential    TSH, 3rd generation with Free T4 reflex    Classic migraine with aura      Has been stable  Patient states his only headaches usually, when his blood pressure is elevated  He has not had any symptoms lately         Insomnia - Primary      Doing well on zolpidem 10 mg nightly without side effects         BARRY (obstructive sleep apnea)      History of obstructive sleep apnea  Patient tried CPAP without benefit  He discontinued use  He is currently sleeping well without next day fatigue         Exertional dyspnea     Kadeem Tinoco  Has been complaining of becoming winded easily in the past few weeks ( or longer)  No symptoms at rest    Will sent for nuclear stress test ( will need to hold atenolol for 24 hours prior to test)  Patient was advised to call 911 or go directly to ER should symptoms worsen  Relevant Orders    PSA, Total Screen    Hemorrhoids       Recommend OTC hydrocortisone, tucks pads, and Sitz baths p r n     Consider referral to colorectal specialist if symptoms persist           Other Visit Diagnoses     Screening for prostate cancer        Relevant Orders    PSA, Total Screen    Screening for diabetes mellitus        Relevant Orders    Comprehensive metabolic panel    Screening cholesterol level        Relevant Orders    Lipid Panel with Direct LDL reflex    Atypical chest pain        Relevant Orders    NM myocardial perfusion spect (rx stress and/or rest)       patient had COVID vaccination, and booster     due for yearly labs after March 18th (will mail lab slip)      recommend in-person follow-up appointment in near future    Reason for visit is No chief complaint on file  Encounter provider Torsten Hopkins DO    Provider located at [facility]  [address]  [address] [facility] [address]  [phone]      Recent Visits  No visits were found meeting these conditions  Showing recent visits within past 7 days and meeting all other requirements  Today's Visits  Date Type Provider Dept   02/05/22 [place]medicine [place] [id number] Chad Peetrson today's visits and meeting all other requirements  Future Appointments  No visits were found meeting these conditions  Showing future appointments within next 150 days and meeting all other requirements       The patient was identified by name and date of birth  Stanford Stock was informed that this is a telemedicine visit and that the visit is being conducted through [address] and patient was informed this is a secure, HIPAA-complaint platform  He agrees to proceed     My office door was closed  No one else was in the room  He acknowledged consent and understanding of privacy and security of the video platform  The patient has agreed to participate and understands they can discontinue the visit at any time  Patient is aware this is a billable service  Subjective  Stanford Stock is a 62 y o  male see HPI   This is a virtual follow-up appointment for patient  Patient has having some issues with exertional shortness of breath lately  Also problems with hemorrhoids  He is compliant with prescribed medications    Last labs were March 2021       Past Medical History:   Diagnosis Date    Hypertension        Past Surgical History:   Procedure Laterality Date    HYDROCELE EXCISION / REPAIR Right 02/11/2013 spermatic cord excision of hydrocele       Current Outpatient Medications   Medication Sig Dispense Refill    amLODIPine (NORVASC) 5 mg tablet TAKE 1 TABLET DAILY 90 tablet 3    atenolol (TENORMIN) 100 mg tablet TAKE 1 TABLET DAILY 90 tablet 3    azelastine (OPTIVAR) 0 05 % ophthalmic solution Administer 1 drop to both eyes 2 (two) times a day 5 mL 5    betamethasone, augmented, (DIPROLENE-AF) 0 05 % cream APPLY TOPICALLY TWICE A DAY 50 g 13    sildenafil (VIAGRA) 100 mg tablet Take 100 mg by mouth as needed       zolpidem (AMBIEN) 10 mg tablet Take 1 tablet (10 mg total) by mouth daily at bedtime as needed for sleep 90 tablet 1     No current facility-administered medications for this visit  No Known Allergies    Review of Systems   Respiratory: Positive for shortness of breath  Cardiovascular: Negative  Gastrointestinal: Negative  Genitourinary: Negative  Video Exam    There were no vitals filed for this visit  Physical Exam     I spent 40 minutes directly with the patient during this visit    VIRTUAL VISIT DISCLAIMER      Juaquin Eddy verbally agrees to participate in [employer]  Pt is aware that [employer] could be limited without vital signs or the ability to perform a full hands-on physical exam  Paulo Gallagher understands he or the provider may request at any time to terminate the video visit and request the patient to seek care or treatment in person

## 2022-02-05 NOTE — ASSESSMENT & PLAN NOTE
Recommend OTC hydrocortisone, tucks pads, and Sitz baths p r n     Consider referral to colorectal specialist if symptoms persist

## 2022-02-05 NOTE — ASSESSMENT & PLAN NOTE
History of borderline control  Patient has not been checking home blood pressures lately  He is compliant with atenolol 100 and amlodipine 5 mg daily  I encouraged him to start monitoring home pressures    I also would like to see him in the office  In near future to check his blood pressure

## 2022-02-05 NOTE — ASSESSMENT & PLAN NOTE
Luis Alberto Jenelle  Has been complaining of becoming winded easily in the past few weeks ( or longer)  No symptoms at rest    Will sent for nuclear stress test ( will need to hold atenolol for 24 hours prior to test)  Patient was advised to call 911 or go directly to ER should symptoms worsen

## 2022-02-05 NOTE — ASSESSMENT & PLAN NOTE
Has been stable  Patient states his only headaches usually, when his blood pressure is elevated    He has not had any symptoms lately

## 2022-02-07 ENCOUNTER — TELEPHONE (OUTPATIENT)
Dept: FAMILY MEDICINE CLINIC | Facility: CLINIC | Age: 62
End: 2022-02-07

## 2022-03-02 DIAGNOSIS — N52.9 ERECTILE DYSFUNCTION, UNSPECIFIED ERECTILE DYSFUNCTION TYPE: Primary | ICD-10-CM

## 2022-03-02 DIAGNOSIS — L30.9 DERMATITIS: ICD-10-CM

## 2022-03-02 DIAGNOSIS — I10 BENIGN ESSENTIAL HYPERTENSION: ICD-10-CM

## 2022-03-02 DIAGNOSIS — I10 ESSENTIAL HYPERTENSION: ICD-10-CM

## 2022-03-02 DIAGNOSIS — J30.1 ALLERGIC RHINITIS DUE TO POLLEN, UNSPECIFIED SEASONALITY: ICD-10-CM

## 2022-03-02 RX ORDER — AMLODIPINE BESYLATE 5 MG/1
5 TABLET ORAL DAILY
Qty: 90 TABLET | Refills: 3 | Status: SHIPPED | OUTPATIENT
Start: 2022-03-02

## 2022-03-02 RX ORDER — ATENOLOL 100 MG/1
100 TABLET ORAL DAILY
Qty: 90 TABLET | Refills: 3 | Status: SHIPPED | OUTPATIENT
Start: 2022-03-02

## 2022-03-02 RX ORDER — SILDENAFIL 100 MG/1
100 TABLET, FILM COATED ORAL AS NEEDED
Qty: 10 TABLET | Refills: 2 | Status: SHIPPED | OUTPATIENT
Start: 2022-03-02

## 2022-03-02 RX ORDER — BETAMETHASONE DIPROPIONATE 0.5 MG/G
1 CREAM TOPICAL 2 TIMES DAILY
Qty: 50 G | Refills: 13 | Status: SHIPPED | OUTPATIENT
Start: 2022-03-02

## 2022-03-02 RX ORDER — AZELASTINE HYDROCHLORIDE 0.5 MG/ML
1 SOLUTION/ DROPS OPHTHALMIC 2 TIMES DAILY
Qty: 5 ML | Refills: 5 | Status: SHIPPED | OUTPATIENT
Start: 2022-03-02 | End: 2022-07-05

## 2022-03-25 LAB
ALBUMIN SERPL-MCNC: 4.6 G/DL (ref 3.8–4.8)
ALBUMIN/GLOB SERPL: 1.8 {RATIO} (ref 1.2–2.2)
ALP SERPL-CCNC: 46 IU/L (ref 44–121)
ALT SERPL-CCNC: 26 IU/L (ref 0–44)
AST SERPL-CCNC: 27 IU/L (ref 0–40)
BASOPHILS # BLD AUTO: 0 X10E3/UL (ref 0–0.2)
BASOPHILS NFR BLD AUTO: 1 %
BILIRUB SERPL-MCNC: 0.4 MG/DL (ref 0–1.2)
BUN SERPL-MCNC: 21 MG/DL (ref 8–27)
BUN/CREAT SERPL: 19 (ref 10–24)
CALCIUM SERPL-MCNC: 9.1 MG/DL (ref 8.6–10.2)
CHLORIDE SERPL-SCNC: 98 MMOL/L (ref 96–106)
CHOLEST SERPL-MCNC: 220 MG/DL (ref 100–199)
CO2 SERPL-SCNC: 20 MMOL/L (ref 20–29)
CREAT SERPL-MCNC: 1.1 MG/DL (ref 0.76–1.27)
EGFR: 76 ML/MIN/1.73
EOSINOPHIL # BLD AUTO: 0.3 X10E3/UL (ref 0–0.4)
EOSINOPHIL NFR BLD AUTO: 6 %
ERYTHROCYTE [DISTWIDTH] IN BLOOD BY AUTOMATED COUNT: 13.1 % (ref 11.6–15.4)
GLOBULIN SER-MCNC: 2.6 G/DL (ref 1.5–4.5)
GLUCOSE SERPL-MCNC: 100 MG/DL (ref 65–99)
HCT VFR BLD AUTO: 43.7 % (ref 37.5–51)
HDLC SERPL-MCNC: 38 MG/DL
HGB BLD-MCNC: 15.1 G/DL (ref 13–17.7)
IMM GRANULOCYTES # BLD: 0 X10E3/UL (ref 0–0.1)
IMM GRANULOCYTES NFR BLD: 0 %
LDLC SERPL CALC-MCNC: 157 MG/DL (ref 0–99)
LDLC SERPL DIRECT ASSAY-MCNC: 157 MG/DL (ref 0–99)
LYMPHOCYTES # BLD AUTO: 1.3 X10E3/UL (ref 0.7–3.1)
LYMPHOCYTES NFR BLD AUTO: 27 %
MCH RBC QN AUTO: 32.3 PG (ref 26.6–33)
MCHC RBC AUTO-ENTMCNC: 34.6 G/DL (ref 31.5–35.7)
MCV RBC AUTO: 94 FL (ref 79–97)
MONOCYTES # BLD AUTO: 0.5 X10E3/UL (ref 0.1–0.9)
MONOCYTES NFR BLD AUTO: 10 %
NEUTROPHILS # BLD AUTO: 2.7 X10E3/UL (ref 1.4–7)
NEUTROPHILS NFR BLD AUTO: 56 %
POTASSIUM SERPL-SCNC: 3.9 MMOL/L (ref 3.5–5.2)
PROT SERPL-MCNC: 7.2 G/DL (ref 6–8.5)
PSA SERPL-MCNC: 0.4 NG/ML (ref 0–4)
RBC # BLD AUTO: 4.67 X10E6/UL (ref 4.14–5.8)
SL AMB VLDL CHOLESTEROL CALC: 25 MG/DL (ref 5–40)
SODIUM SERPL-SCNC: 136 MMOL/L (ref 134–144)
TRIGL SERPL-MCNC: 137 MG/DL (ref 0–149)
TSH SERPL DL<=0.005 MIU/L-ACNC: 1.79 UIU/ML (ref 0.45–4.5)
WBC # BLD AUTO: 4.9 X10E3/UL (ref 3.4–10.8)

## 2022-03-26 ENCOUNTER — OFFICE VISIT (OUTPATIENT)
Dept: FAMILY MEDICINE CLINIC | Facility: CLINIC | Age: 62
End: 2022-03-26
Payer: COMMERCIAL

## 2022-03-26 VITALS
TEMPERATURE: 98.2 F | HEIGHT: 71 IN | SYSTOLIC BLOOD PRESSURE: 134 MMHG | HEART RATE: 56 BPM | WEIGHT: 233 LBS | BODY MASS INDEX: 32.62 KG/M2 | OXYGEN SATURATION: 98 % | DIASTOLIC BLOOD PRESSURE: 82 MMHG

## 2022-03-26 DIAGNOSIS — G47.30 SLEEP APNEA, UNSPECIFIED TYPE: ICD-10-CM

## 2022-03-26 DIAGNOSIS — R73.09 ABNORMAL BLOOD SUGAR: ICD-10-CM

## 2022-03-26 DIAGNOSIS — H69.82 DYSFUNCTION OF LEFT EUSTACHIAN TUBE: ICD-10-CM

## 2022-03-26 DIAGNOSIS — G47.33 OSA (OBSTRUCTIVE SLEEP APNEA): ICD-10-CM

## 2022-03-26 DIAGNOSIS — I10 BENIGN ESSENTIAL HYPERTENSION: ICD-10-CM

## 2022-03-26 DIAGNOSIS — E78.49 FAMILIAL COMBINED HYPERLIPIDEMIA: Primary | ICD-10-CM

## 2022-03-26 DIAGNOSIS — G47.00 INSOMNIA, UNSPECIFIED TYPE: ICD-10-CM

## 2022-03-26 DIAGNOSIS — G47.33 OBSTRUCTIVE SLEEP APNEA: ICD-10-CM

## 2022-03-26 DIAGNOSIS — R06.00 EXERTIONAL DYSPNEA: ICD-10-CM

## 2022-03-26 PROBLEM — K64.9 HEMORRHOIDS: Status: RESOLVED | Noted: 2022-02-05 | Resolved: 2022-03-26

## 2022-03-26 PROBLEM — M79.641 HAND PAIN, RIGHT: Status: RESOLVED | Noted: 2018-03-12 | Resolved: 2022-03-26

## 2022-03-26 PROCEDURE — 99214 OFFICE O/P EST MOD 30 MIN: CPT | Performed by: FAMILY MEDICINE

## 2022-03-26 PROCEDURE — 3008F BODY MASS INDEX DOCD: CPT | Performed by: FAMILY MEDICINE

## 2022-03-26 PROCEDURE — 3725F SCREEN DEPRESSION PERFORMED: CPT | Performed by: FAMILY MEDICINE

## 2022-03-26 PROCEDURE — 1036F TOBACCO NON-USER: CPT | Performed by: FAMILY MEDICINE

## 2022-03-26 RX ORDER — AMOXICILLIN 875 MG/1
875 TABLET, COATED ORAL 2 TIMES DAILY
Qty: 20 TABLET | Refills: 0 | Status: SHIPPED | OUTPATIENT
Start: 2022-03-26 | End: 2022-04-05

## 2022-03-26 RX ORDER — ZOLPIDEM TARTRATE 10 MG/1
10 TABLET ORAL
Qty: 90 TABLET | Refills: 1 | Status: SHIPPED | OUTPATIENT
Start: 2022-03-26

## 2022-03-26 NOTE — ASSESSMENT & PLAN NOTE
Somewhat suboptimal   Today 134/82  But patient states that his blood pressures have been as high as 180-190 lately  He recently cut out alcohol and his blood pressure seemed to be reduced as a result  He is compliant with atenolol 100 and amlodipine 5 mg daily  Recommend continue to monitor home blood pressures    Also continue follow-up with cardiologist [place]

## 2022-03-26 NOTE — ASSESSMENT & PLAN NOTE
History of obstructive sleep apnea  Patient tried CPAP without benefit  This is been discontinued    Overall is doing well

## 2022-03-26 NOTE — PROGRESS NOTES
[facility]      NAME: Stanford Stock  AGE: 65 y o  SEX: male  : 1960   MRN: [id number]    DATE: 3/26/2022  TIME: 12:34 PM    Assessment and Plan     Problem List Items Addressed This Visit     Benign essential hypertension     Somewhat suboptimal   Today 134/82  But patient states that his blood pressures have been as high as 180-190 lately  He recently cut out alcohol and his blood pressure seemed to be reduced as a result  He is compliant with atenolol 100 and amlodipine 5 mg daily  Recommend continue to monitor home blood pressures  Also continue follow-up with cardiologist [place]         Familial combined hyperlipidemia - Primary     Cholesterol from  was 220, , HDL was 38  Discussed diet exercise  Patient will most likely need to start a statin at some point  I will leave this up to the cardiologist           Insomnia     Patient continues to do well on zolpidem 10 mg nightly without side effects  Med was refilled today         BARRY (obstructive sleep apnea)     History of obstructive sleep apnea  Patient tried CPAP without benefit  This is been discontinued  Overall is doing well         Exertional dyspnea     He continues to have exertional shortness of breath  He was seen by cardiologist at [place] last week  Echocardiogram and stress test were ordered  ER precautions given         Abnormal blood sugar     Blood sugar from   Recommend reduced carb diet exercise  Will continue to follow         RESOLVED: Obstructive sleep apnea      Other Visit Diagnoses     Sleep apnea, unspecified type        Relevant Medications    zolpidem (AMBIEN) 10 mg tablet    Dysfunction of left eustachian tube        Rx given amoxicillin  Also recommend starting Flonase nasal spray    Relevant Medications    amoxicillin (AMOXIL) 875 mg tablet        Patient had COVID vaccination, and booster  Tetanus booster   Discussed Shingrix    Patient will consider    Current with colonoscopy  Labs from March 24th reviewed (including PSA which was normal)  Return to office in:  6 months (patient encouraged to find a PCP in [place]  I would be happy to continue to help him in any way he needs in the meantime)  Chief Complaint     Chief Complaint   Patient presents with    Follow-up       History of Present Illness     Patient presents for recheck chronic medical problems  He is still experiencing some exertional dyspnea  He is now seeing a cardiologist in [place]  Otherwise doing well  He is compliant with prescribed medications  Patient had labs drawn on March 24th      The following portions of the patient's history were reviewed and updated as appropriate: allergies, current medications, past family history, past medical history, past social history, past surgical history and problem list     Review of Systems   Review of Systems   Respiratory: Positive for shortness of breath  Cardiovascular: Negative  Gastrointestinal: Negative  Genitourinary: Negative  Active Problem List     Patient Active Problem List   Diagnosis    Arthralgia of multiple joints    Benign essential hypertension    Chronic kidney disease (CKD), stage II (mild)    Classic migraine with aura    Erectile dysfunction    Familial combined hyperlipidemia    Insomnia    Vitamin D deficiency    Arthritis of knee    Benign prostatic hyperplasia with weak urinary stream    Acute otitis media    Dysfunction of both eustachian tubes    BARRY (obstructive sleep apnea)    Elevated blood sugar    Allergic rhinitis due to pollen    Dermatitis    Exertional dyspnea    Abnormal blood sugar       Objective   /82   Pulse 56   Temp 98 2 °F (36 8 °C) (Tympanic)   Ht 5' 11" (1 803 m)   Wt 106 kg (233 lb)   SpO2 98%   BMI 32 50 kg/m²     Physical Exam  Cardiovascular:      Rate and Rhythm: Normal rate and regular rhythm        Heart sounds: Normal heart sounds  Comments: Carotids: no bruits  Ext: no edema  Pulmonary:      Effort: Pulmonary effort is normal  No respiratory distress  Breath sounds: No wheezing or rales  Psychiatric:         Behavior: Behavior normal          Thought Content:  Thought content normal          Pertinent Laboratory/Diagnostic Studies:  Labs March 24th    Current Medications     Current Outpatient Medications:     amLODIPine (NORVASC) 5 mg tablet, Take 1 tablet (5 mg total) by mouth daily, Disp: 90 tablet, Rfl: 3    atenolol (TENORMIN) 100 mg tablet, Take 1 tablet (100 mg total) by mouth daily, Disp: 90 tablet, Rfl: 3    azelastine (OPTIVAR) 0 05 % ophthalmic solution, Administer 1 drop to both eyes 2 (two) times a day, Disp: 5 mL, Rfl: 5    betamethasone, augmented, (DIPROLENE-AF) 0 05 % cream, Apply 1 application topically 2 (two) times a day, Disp: 50 g, Rfl: 13    sildenafil (Viagra) 100 mg tablet, Take 1 tablet (100 mg total) by mouth as needed for erectile dysfunction, Disp: 10 tablet, Rfl: 2    zolpidem (AMBIEN) 10 mg tablet, Take 1 tablet (10 mg total) by mouth daily at bedtime as needed for sleep, Disp: 90 tablet, Rfl: 1    amoxicillin (AMOXIL) 875 mg tablet, Take 1 tablet (875 mg total) by mouth 2 (two) times a day for 10 days, Disp: 20 tablet, Rfl: 0    Health Maintenance     Health Maintenance   Topic Date Due    Hepatitis C Screening  Never done    HIV Screening  Never done    BMI: Followup Plan  Never done    Annual Physical  Never done    Influenza Vaccine (1) 09/01/2021    Depression Screening  03/26/2023    BMI: Adult  03/26/2023    Colorectal Cancer Screening  01/01/2026    DTaP,Tdap,and Td Vaccines (2 - Td or Tdap) 01/15/2028    COVID-19 Vaccine  Completed    Pneumococcal Vaccine: Pediatrics (0 to 5 Years) and At-Risk Patients (6 to 59 Years)  Aged Out    HIB Vaccine  Aged Out    Hepatitis B Vaccine  Aged Out    IPV Vaccine  Aged Out    Hepatitis A Vaccine  Aged Out    Meningococcal ACWY Vaccine  Aged Out    HPV Vaccine  Aged Out     Immunization History   Administered Date(s) Administered    COVID-19 PFIZER VACCINE 0 3 ML IM 03/27/2021, 04/17/2021, 12/21/2021    Influenza, injectable, quadrivalent, preservative free 0 5 mL 11/17/2018    Tdap 01/15/2018       Braden Jacobo DO  [facility]

## 2022-03-26 NOTE — ASSESSMENT & PLAN NOTE
Patient continues to do well on zolpidem 10 mg nightly without side effects    Med was refilled today

## 2022-03-26 NOTE — ASSESSMENT & PLAN NOTE
He continues to have exertional shortness of breath  He was seen by cardiologist at [place] last week  Echocardiogram and stress test were ordered    ER precautions given

## 2022-03-26 NOTE — ASSESSMENT & PLAN NOTE
Blood sugar from March 24th 100  Recommend reduced carb diet exercise    Will continue to follow 01-Oct-2021

## 2022-03-26 NOTE — ASSESSMENT & PLAN NOTE
Cholesterol from March 24th was 220, , HDL was 38  Discussed diet exercise  Patient will most likely need to start a statin at some point    I will leave this up to the cardiologist

## 2022-07-02 DIAGNOSIS — J30.1 ALLERGIC RHINITIS DUE TO POLLEN, UNSPECIFIED SEASONALITY: ICD-10-CM

## 2022-07-05 RX ORDER — AZELASTINE HYDROCHLORIDE 0.5 MG/ML
SOLUTION/ DROPS OPHTHALMIC
Qty: 12 ML | Refills: 1 | Status: SHIPPED | OUTPATIENT
Start: 2022-07-05

## 2022-07-13 ENCOUNTER — TELEMEDICINE (OUTPATIENT)
Dept: FAMILY MEDICINE CLINIC | Facility: CLINIC | Age: 62
End: 2022-07-13
Payer: COMMERCIAL

## 2022-07-13 DIAGNOSIS — F41.0 PANIC ATTACK: Primary | ICD-10-CM

## 2022-07-13 PROCEDURE — 99213 OFFICE O/P EST LOW 20 MIN: CPT | Performed by: FAMILY MEDICINE

## 2022-07-13 NOTE — PROGRESS NOTES
Virtual Regular Visit    Verification of patient location:    Patient is located in the following state in which I hold an active license [place]      Assessment/Plan:    Problem List Items Addressed This Visit    None     Visit Diagnoses     Panic attack    -  Primary      Patient complains 1 month history panic attacks that seem to be occurring during the week, but not on the weekends  They only last a few minutes  He is currently being worked up by cardiologist in [place] due to exertional dyspnea  He states he had abnormal cardiac testing and will have a LUCÍA performed in near future due to possible valvular dysfunction  Patient denies any acute anxiety or any new triggers  I advised him that he needs to find a new PCP in [place] for acute problems  Patient states he would like to continue to see me every 6 months for chronic issues  Call if further problems           Reason for visit is   Chief Complaint   Patient presents with    Virtual Regular Visit          Encounter provider Dione Bashir DO    Provider located at [address] [place] [address]  [phone]      Recent Visits  No visits were found meeting these conditions  Showing recent visits within past 7 days and meeting all other requirements  Today's Visits  Date Type Provider Dept   07/13/22 Telemedicine Dione Bashir DO [place] [id number] Chad Nicholas today's visits and meeting all other requirements  Future Appointments  No visits were found meeting these conditions  Showing future appointments within next 150 days and meeting all other requirements       The patient was identified by name and date of birth  Crissy Nicole was informed that this is a telemedicine visit and that the visit is being conducted through [address] [place] [address] Now and patient was informed that this is a secure, HIPAA-compliant platform  He agrees to proceed     My office door was closed   No one else was in the room   He acknowledged consent and understanding of privacy and security of the video platform  The patient has agreed to participate and understands they can discontinue the visit at any time  Patient is aware this is a billable service  Subjective  Kira Sawyer is a 65 y o  male see HPI   Patient complains 1 month history panic attacks that seem to be occurring during the week, but not on the weekends  They only last a few minutes  He is currently being worked up by cardiologist in [place] due to exertional dyspnea  He states he had abnormal cardiac testing and will have a LUCÍA performed in near future due to possible valvular dysfunction  Past Medical History:   Diagnosis Date    Hypertension        Past Surgical History:   Procedure Laterality Date    HYDROCELE EXCISION / REPAIR Right 02/11/2013    spermatic cord excision of hydrocele       Current Outpatient Medications   Medication Sig Dispense Refill    amLODIPine (NORVASC) 5 mg tablet Take 1 tablet (5 mg total) by mouth daily 90 tablet 3    atenolol (TENORMIN) 100 mg tablet Take 1 tablet (100 mg total) by mouth daily 90 tablet 3    azelastine (OPTIVAR) 0 05 % ophthalmic solution INSTILL 1 DROP INTO BOTH  EYES TWICE DAILY 12 mL 1    betamethasone, augmented, (DIPROLENE-AF) 0 05 % cream Apply 1 application topically 2 (two) times a day 50 g 13    sildenafil (Viagra) 100 mg tablet Take 1 tablet (100 mg total) by mouth as needed for erectile dysfunction 10 tablet 2    zolpidem (AMBIEN) 10 mg tablet Take 1 tablet (10 mg total) by mouth daily at bedtime as needed for sleep 90 tablet 1     No current facility-administered medications for this visit  Allergies   Allergen Reactions    Shellfish Allergy - Food Allergy Hives and Itching       Review of Systems   Respiratory: Negative  Cardiovascular: Negative  Gastrointestinal: Negative  Genitourinary: Negative  Psychiatric/Behavioral: The patient is nervous/anxious  Video Exam    There were no vitals filed for this visit  Physical Exam     I spent 25 minutes directly with the patient during this visit    VIRTUAL VISIT DISCLAIMER  It was my intent to perform this visit via video technology but the patient was not able to do a video connection so the visit was completed via audio telephone only  Marco Segura verbally agrees to participate in [employer]  Pt is aware that [employer] could be limited without vital signs or the ability to perform a full hands-on physical exam  Paulo Rossi understands he or the provider may request at any time to terminate the video visit and request the patient to seek care or treatment in person

## 2022-09-28 DIAGNOSIS — G47.30 SLEEP APNEA, UNSPECIFIED TYPE: ICD-10-CM

## 2022-10-03 DIAGNOSIS — G47.30 SLEEP APNEA, UNSPECIFIED TYPE: ICD-10-CM

## 2022-10-03 RX ORDER — ZOLPIDEM TARTRATE 10 MG/1
10 TABLET ORAL
Qty: 90 TABLET | Refills: 0 | Status: SHIPPED | OUTPATIENT
Start: 2022-10-03 | End: 2022-10-03 | Stop reason: SDUPTHER

## 2022-10-03 RX ORDER — ZOLPIDEM TARTRATE 10 MG/1
10 TABLET ORAL
Qty: 90 TABLET | Refills: 0 | Status: SHIPPED | OUTPATIENT
Start: 2022-10-03

## 2022-10-03 NOTE — TELEPHONE ENCOUNTER
Pt called to check on the zolpidem and I told him that it was sent to [facility]  [facility]

## 2022-11-12 DIAGNOSIS — N52.9 ERECTILE DYSFUNCTION, UNSPECIFIED ERECTILE DYSFUNCTION TYPE: ICD-10-CM

## 2022-11-12 NOTE — TELEPHONE ENCOUNTER
Medication Refill Request     Name sildenafil (Viagra) 100 mg tablet  Dose/Frequency Take 1 tablet (100 mg total) by mouth as needed for erectile dysfunction  Quantity 10 tablet  Verified pharmacy   [x]  Verified ordering Provider   [x]  Does patient have enough for the next 3 days?  Yes [x] No []

## 2022-11-14 RX ORDER — SILDENAFIL 100 MG/1
100 TABLET, FILM COATED ORAL AS NEEDED
Qty: 10 TABLET | Refills: 0 | Status: SHIPPED | OUTPATIENT
Start: 2022-11-14

## 2022-12-26 DIAGNOSIS — G47.30 SLEEP APNEA, UNSPECIFIED TYPE: ICD-10-CM

## 2022-12-27 RX ORDER — ZOLPIDEM TARTRATE 10 MG/1
10 TABLET ORAL
Qty: 90 TABLET | Refills: 0 | Status: SHIPPED | OUTPATIENT
Start: 2022-12-27

## 2022-12-27 NOTE — TELEPHONE ENCOUNTER
Medication Refill Request     Name Ambien   Dose/Frequency 10mg/ 1 at bedtime prn  Quantity 90 tab  Verified pharmacy   [x]  Verified ordering Provider   [x]  Does patient have enough for the next 3 days?  Yes [x] No []

## 2023-01-19 ENCOUNTER — TELEPHONE (OUTPATIENT)
Dept: FAMILY MEDICINE CLINIC | Facility: CLINIC | Age: 63
End: 2023-01-19

## 2023-01-19 NOTE — TELEPHONE ENCOUNTER
----- Message from Ej Dallas MA sent at 1/18/2023  9:56 AM EST -----  Regarding: Need appointment  Please contact patient to schedule follow up appointment with their provider

## 2023-01-19 NOTE — TELEPHONE ENCOUNTER
Spoke with patient  He was currently working at time of call but will call back tomorrow on his day off to schedule

## 2023-02-07 NOTE — TELEPHONE ENCOUNTER
Pt called and stated that he moved out of state   His new address is [place] [address] , [place] , [address] [place] [address]

## 2023-02-10 NOTE — TELEPHONE ENCOUNTER
02/10/23 9:39 AM     The office's request has been received, reviewed, and the patient chart updated  The PCP has successfully been removed with a patient attribution note  This message will now be completed      Thank you  Tawanda Blackburn

## 2023-09-08 DIAGNOSIS — J30.1 ALLERGIC RHINITIS DUE TO POLLEN, UNSPECIFIED SEASONALITY: ICD-10-CM

## 2023-09-08 NOTE — TELEPHONE ENCOUNTER
Spoke w/pt. Regarding the pending drug, pt did not know what the drug is. Pt also stated that he has moved out of state and is no longer a pt of [address]. Pt stated that he did inform Dr. Ann Marie Coyle that he moved out of state and was told by Dr. Ann Marie Coyle that he would need to find another provider in the state where he is residing.

## 2023-09-11 RX ORDER — AZELASTINE HYDROCHLORIDE 0.5 MG/ML
SOLUTION/ DROPS OPHTHALMIC
Qty: 24 ML | Refills: 1 | OUTPATIENT
Start: 2023-09-11